# Patient Record
Sex: FEMALE | Race: WHITE | NOT HISPANIC OR LATINO | Employment: OTHER | URBAN - METROPOLITAN AREA
[De-identification: names, ages, dates, MRNs, and addresses within clinical notes are randomized per-mention and may not be internally consistent; named-entity substitution may affect disease eponyms.]

---

## 2017-05-31 ENCOUNTER — ALLSCRIPTS OFFICE VISIT (OUTPATIENT)
Dept: OTHER | Facility: OTHER | Age: 62
End: 2017-05-31

## 2017-09-21 ENCOUNTER — HOSPITAL ENCOUNTER (OUTPATIENT)
Dept: MAMMOGRAPHY | Facility: CLINIC | Age: 62
Discharge: HOME | End: 2017-09-21
Payer: OTHER GOVERNMENT

## 2017-09-21 DIAGNOSIS — Z12.31 ENCOUNTER FOR SCREENING MAMMOGRAM FOR MALIGNANT NEOPLASM OF BREAST: ICD-10-CM

## 2017-09-21 DIAGNOSIS — Z80.3 FAMILY HISTORY OF MALIGNANT NEOPLASM OF BREAST: ICD-10-CM

## 2017-09-21 PROCEDURE — 77063 BREAST TOMOSYNTHESIS BI: CPT

## 2017-09-21 PROCEDURE — G0202 SCR MAMMO BI INCL CAD: HCPCS

## 2018-01-03 ENCOUNTER — TRANSCRIBE ORDERS (OUTPATIENT)
Dept: ADMINISTRATIVE | Facility: HOSPITAL | Age: 63
End: 2018-01-03

## 2018-01-03 DIAGNOSIS — Z13.820 OSTEOPOROSIS SCREENING: Primary | ICD-10-CM

## 2018-01-10 ENCOUNTER — GENERIC CONVERSION - ENCOUNTER (OUTPATIENT)
Dept: OTHER | Facility: OTHER | Age: 63
End: 2018-01-10

## 2018-01-10 ENCOUNTER — HOSPITAL ENCOUNTER (OUTPATIENT)
Dept: BONE DENSITY | Facility: IMAGING CENTER | Age: 63
Discharge: HOME | End: 2018-01-10
Payer: OTHER GOVERNMENT

## 2018-01-10 DIAGNOSIS — Z13.820 OSTEOPOROSIS SCREENING: ICD-10-CM

## 2018-01-10 PROCEDURE — 77080 DXA BONE DENSITY AXIAL: CPT

## 2018-01-13 VITALS
SYSTOLIC BLOOD PRESSURE: 140 MMHG | HEIGHT: 67 IN | TEMPERATURE: 98.3 F | HEART RATE: 76 BPM | WEIGHT: 182.25 LBS | DIASTOLIC BLOOD PRESSURE: 80 MMHG | RESPIRATION RATE: 18 BRPM | BODY MASS INDEX: 28.61 KG/M2

## 2018-05-24 PROBLEM — Z80.3 FAMILY HISTORY OF BREAST CANCER: Status: ACTIVE | Noted: 2018-05-24

## 2018-05-30 ENCOUNTER — OFFICE VISIT (OUTPATIENT)
Dept: SURGICAL ONCOLOGY | Facility: CLINIC | Age: 63
End: 2018-05-30
Payer: OTHER GOVERNMENT

## 2018-05-30 VITALS
HEART RATE: 68 BPM | BODY MASS INDEX: 28.25 KG/M2 | RESPIRATION RATE: 14 BRPM | WEIGHT: 180 LBS | DIASTOLIC BLOOD PRESSURE: 78 MMHG | SYSTOLIC BLOOD PRESSURE: 142 MMHG | HEIGHT: 67 IN | TEMPERATURE: 99 F

## 2018-05-30 DIAGNOSIS — Z80.3 FAMILY HISTORY OF BREAST CANCER: Primary | ICD-10-CM

## 2018-05-30 DIAGNOSIS — Z12.31 VISIT FOR SCREENING MAMMOGRAM: ICD-10-CM

## 2018-05-30 PROCEDURE — 99213 OFFICE O/P EST LOW 20 MIN: CPT | Performed by: NURSE PRACTITIONER

## 2018-05-30 RX ORDER — OMEPRAZOLE 20 MG/1
CAPSULE, DELAYED RELEASE ORAL
COMMUNITY
Start: 2018-03-02

## 2018-05-30 RX ORDER — B-COMPLEX WITH VITAMIN C
TABLET ORAL DAILY
COMMUNITY
Start: 2012-04-19

## 2018-05-30 RX ORDER — GLUCOSAMINE SULFATE 500 MG
CAPSULE ORAL DAILY
COMMUNITY
Start: 2012-04-12

## 2018-05-30 RX ORDER — MELOXICAM 15 MG/1
TABLET ORAL
COMMUNITY
Start: 2018-03-02

## 2018-05-30 RX ORDER — MONTELUKAST SODIUM 10 MG/1
TABLET ORAL
COMMUNITY
Start: 2018-05-26

## 2018-05-30 RX ORDER — CALCIUM CARBONATE 500(1250)
TABLET ORAL DAILY
COMMUNITY
Start: 2012-04-12

## 2018-05-30 NOTE — PROGRESS NOTES
Surgical Oncology Follow Up       [address] [facility],[address] [facility]  [facility] [place]  [address] [place] [address] [address] [place]  1955  [id number]  [address] [place] [address],[address] [place] [address]  [facility] [place]  [address] [address]    Chief Complaint   Patient presents with    Follow-up     1 yr f/u family history breast cancer       Assessment/Plan:  1  Family history of breast cancer  -1 year follow up    2  Visit for screening mammogram  - Mammo screening bilateral w cad; Future      Discussion/Summary:  Patient is a 76-year-old female who presents today for a 1 year follow-up visit for an increased risk of breast cancer due to a family history  The patient has had 1 breast biopsy which was a cyst and her sister was diagnosed with breast cancer in her 46s  Patient has declined genetic testing  She has preferred to avoid breast MRIs in the past and has been followed with annual mammography as well as clinical breast exams  She had a bilateral screening mammogram performed on September 21, 2017 which was BI-RADS 2  Her lifetime tie risk 2 sick risk is 21 8% and her NCI lifetime risk is 21 4%  No worrisome findings on today's exam  Her insurance did not cover the 3D mammogram last year  She does not have dense breast tissue  Therefore, we will order a bilateral screening mammogram for September  She has a clinical breast exam by Dr Renea Padilla in winter, therefore, we will see the patient back in 1 year, or sooner if the need arises  She was instructed to call with any new concerns or symptoms  All of her questions were answered  History of Present Illness:      -Interval History: Patient presents today for a family history of breast cancer  She had a bilateral screening mammogram performed on September 21, 2017 which was BI-RADS 2    She reports that her niece was diagnosed with breast cancer in her 42's and tested negative to genetic mutations  Denies other changes in family history  She has no concerns today  Review of Systems:  Review of Systems   Constitutional: Negative for activity change, appetite change, chills, fatigue, fever and unexpected weight change  HENT: Negative for trouble swallowing  Eyes: Negative for pain, redness and visual disturbance  Respiratory: Negative for cough, shortness of breath and wheezing  Cardiovascular: Negative for chest pain, palpitations and leg swelling  Gastrointestinal: Negative for abdominal pain, constipation, diarrhea, nausea and vomiting  Endocrine: Negative for cold intolerance and heat intolerance  Musculoskeletal: Positive for arthralgias  Negative for back pain, gait problem and myalgias  Skin: Negative for color change and rash  Neurological: Negative for dizziness, syncope, light-headedness, numbness and headaches  Hematological: Negative for adenopathy  Psychiatric/Behavioral: Negative for agitation and confusion  All other systems reviewed and are negative  Patient Active Problem List   Diagnosis    Asthma    GERD (gastroesophageal reflux disease)    Family history of breast cancer     History reviewed  No pertinent past medical history  History reviewed  No pertinent surgical history  Family History   Problem Relation Age of Onset    Colon cancer Father      metastatic    Breast cancer Sister      Social History     Social History    Marital status:      Spouse name: N/A    Number of children: N/A    Years of education: N/A     Occupational History    Not on file       Social History Main Topics    Smoking status: Not on file    Smokeless tobacco: Not on file    Alcohol use Not on file    Drug use: Unknown    Sexual activity: Not on file     Other Topics Concern    Not on file     Social History Narrative    No narrative on file       Current Outpatient Prescriptions:     Calcium 500 MG tablet, Take by mouth daily, Disp: , Rfl:    glucosamine 500 MG CAPS capsule, Take by mouth daily, Disp: , Rfl:     meloxicam (MOBIC) 15 mg tablet, , Disp: , Rfl:     montelukast (SINGULAIR) 10 mg tablet, , Disp: , Rfl:     omeprazole (PriLOSEC) 20 mg delayed release capsule, , Disp: , Rfl:     VITAMIN B COMPLEX-C CAPS, Take by mouth daily, Disp: , Rfl:   Allergies   Allergen Reactions    Hydrocodone-Acetaminophen Confusion     Reaction Date: 12Apr2012;      Vitals:    05/30/18 0849   BP: 142/78   Pulse: 68   Resp: 14   Temp: 99 °F (37 2 °C)       Physical Exam   Constitutional: She is oriented to person, place, and time  Vital signs are normal  She appears well-developed and well-nourished  No distress  HENT:   Head: Normocephalic and atraumatic  Neck: Normal range of motion  Cardiovascular: Normal rate, regular rhythm and normal heart sounds  Pulmonary/Chest: Effort normal and breath sounds normal    Bilateral breasts were examined in the sitting and supine position  There are no masses, skin nodules, nipple changes or nipple discharge  There is no bilateral supraclavicular or axillary lymphadenopathy noted  Abdominal: Soft  Normal appearance  She exhibits no mass  There is no hepatosplenomegaly  There is no tenderness  Musculoskeletal: Normal range of motion  Lymphadenopathy:     She has no axillary adenopathy  Right: No supraclavicular adenopathy present  Left: No supraclavicular adenopathy present  Neurological: She is alert and oriented to person, place, and time  Skin: Skin is warm, dry and intact  No rash noted  She is not diaphoretic  Psychiatric: She has a normal mood and affect  Her speech is normal    Vitals reviewed  Advance Care Planning/Advance Directives:  Discussed disease status, treatment goals with the patient

## 2018-09-13 DIAGNOSIS — Z12.31 VISIT FOR SCREENING MAMMOGRAM: Primary | ICD-10-CM

## 2018-09-24 ENCOUNTER — HOSPITAL ENCOUNTER (OUTPATIENT)
Dept: MAMMOGRAPHY | Facility: CLINIC | Age: 63
Discharge: HOME | End: 2018-09-24
Payer: OTHER GOVERNMENT

## 2018-09-24 DIAGNOSIS — Z12.31 VISIT FOR SCREENING MAMMOGRAM: ICD-10-CM

## 2018-09-24 PROCEDURE — 77063 BREAST TOMOSYNTHESIS BI: CPT

## 2018-09-24 PROCEDURE — 77067 SCR MAMMO BI INCL CAD: CPT

## 2019-08-23 ENCOUNTER — TRANSCRIBE ORDERS (OUTPATIENT)
Dept: ADMINISTRATIVE | Facility: HOSPITAL | Age: 64
End: 2019-08-23

## 2019-08-23 DIAGNOSIS — Z12.39 BREAST SCREENING: ICD-10-CM

## 2019-08-23 DIAGNOSIS — Z12.39 BREAST SCREENING, UNSPECIFIED: Primary | ICD-10-CM

## 2019-11-13 ENCOUNTER — TELEPHONE (OUTPATIENT)
Dept: SCHEDULING | Facility: CLINIC | Age: 64
End: 2019-11-13

## 2019-11-13 NOTE — TELEPHONE ENCOUNTER
Pt is self referred. Pt would like to schedule appt for cardiac evaluation. Pt has swelling of ankles. No previous cardiologist. Pt can be reached at [phone].

## 2019-11-14 NOTE — TELEPHONE ENCOUNTER
I called and spoke to pt. She states her  is a pt of Dr. Nicholson- he had recommended she see Dr. Sargent for her swollen ankles. Pt states its been since July. No sob, cp or palps.     Pt states her cholesterol in under 200- its always borderline.    She last saw her PCP and had labs done in Aug 2019.    Pt has never seen a cardiologist -- no cardiac testing.    No urgent care, ER or hospital recently.    Family History of Heart Disease  Mom- HTN  Paternal grandmother- stroke @ 80s   Brother- Bypass @ 67y/o    Pt is scheduled for 12/16/19- [facility] (per her request)    I faxed PCP for records    Mailed NPP to pt's home.

## 2019-12-03 ENCOUNTER — HOSPITAL ENCOUNTER (OUTPATIENT)
Dept: MAMMOGRAPHY | Facility: CLINIC | Age: 64
Discharge: HOME | End: 2019-12-03
Payer: OTHER GOVERNMENT

## 2019-12-03 VITALS — WEIGHT: 180 LBS | HEIGHT: 67 IN | BODY MASS INDEX: 28.25 KG/M2

## 2019-12-03 DIAGNOSIS — Z12.39 BREAST SCREENING: ICD-10-CM

## 2019-12-03 PROCEDURE — 77067 SCR MAMMO BI INCL CAD: CPT

## 2019-12-03 PROCEDURE — 77063 BREAST TOMOSYNTHESIS BI: CPT

## 2019-12-16 ENCOUNTER — OFFICE VISIT (OUTPATIENT)
Dept: CARDIOLOGY | Facility: CLINIC | Age: 64
End: 2019-12-16
Payer: OTHER GOVERNMENT

## 2019-12-16 ENCOUNTER — TELEPHONE (OUTPATIENT)
Dept: CARDIOLOGY | Facility: CLINIC | Age: 64
End: 2019-12-16

## 2019-12-16 VITALS
DIASTOLIC BLOOD PRESSURE: 80 MMHG | SYSTOLIC BLOOD PRESSURE: 120 MMHG | OXYGEN SATURATION: 95 % | HEART RATE: 83 BPM | BODY MASS INDEX: 29.41 KG/M2 | HEIGHT: 66 IN | WEIGHT: 183 LBS

## 2019-12-16 DIAGNOSIS — R94.31 ABNORMAL EKG: ICD-10-CM

## 2019-12-16 DIAGNOSIS — Z82.49 FAMILY HISTORY OF PREMATURE CAD: ICD-10-CM

## 2019-12-16 DIAGNOSIS — E78.2 MIXED HYPERLIPIDEMIA: ICD-10-CM

## 2019-12-16 DIAGNOSIS — R60.9 EDEMA, UNSPECIFIED TYPE: ICD-10-CM

## 2019-12-16 DIAGNOSIS — Z82.49 FAMILY HISTORY OF PREMATURE CAD: Primary | ICD-10-CM

## 2019-12-16 DIAGNOSIS — J45.909 ASTHMA, UNSPECIFIED ASTHMA SEVERITY, UNSPECIFIED WHETHER COMPLICATED, UNSPECIFIED WHETHER PERSISTENT: Primary | ICD-10-CM

## 2019-12-16 PROBLEM — R60.0 LOCALIZED EDEMA: Status: ACTIVE | Noted: 2019-12-16

## 2019-12-16 PROCEDURE — 99244 OFF/OP CNSLTJ NEW/EST MOD 40: CPT | Performed by: INTERNAL MEDICINE

## 2019-12-16 PROCEDURE — 93000 ELECTROCARDIOGRAM COMPLETE: CPT | Performed by: INTERNAL MEDICINE

## 2019-12-16 RX ORDER — OMEPRAZOLE 20 MG/1
20 CAPSULE, DELAYED RELEASE ORAL SEE ADMIN INSTRUCTIONS
COMMUNITY

## 2019-12-16 RX ORDER — MONTELUKAST SODIUM 10 MG/1
TABLET ORAL NIGHTLY
COMMUNITY

## 2019-12-16 RX ORDER — MELOXICAM 15 MG/1
15 TABLET ORAL DAILY
COMMUNITY

## 2019-12-16 ASSESSMENT — ENCOUNTER SYMPTOMS
BRUISES/BLEEDS EASILY: 0
PND: 0
JAUNDICE: 0
CLAUDICATION: 0
HEMOPTYSIS: 0
NUMBNESS: 0
WEIGHT GAIN: 0
ANOREXIA: 0
VERTIGO: 0
NERVOUS/ANXIOUS: 0
HOARSE VOICE: 0
PALPITATIONS: 0
HEARTBURN: 0
COUGH: 0
LIGHT-HEADEDNESS: 0
HEADACHES: 0
WHEEZING: 0
CONSTIPATION: 0
SHORTNESS OF BREATH: 0
SYNCOPE: 0
ORTHOPNEA: 0
NEAR-SYNCOPE: 0
FALLS: 0
FREQUENCY: 0
SLEEP DISTURBANCES DUE TO BREATHING: 0
SPUTUM PRODUCTION: 0
CHANGE IN BOWEL HABIT: 0
NAUSEA: 0
DYSPNEA ON EXERTION: 0
IRREGULAR HEARTBEAT: 0
SNORING: 0
HEMATOLOGIC/LYMPHATIC NEGATIVE: 1
TREMORS: 0
MEMORY LOSS: 0
WEIGHT LOSS: 0
ABDOMINAL PAIN: 0
DIARRHEA: 0
FOCAL WEAKNESS: 0
MYALGIAS: 0
INSOMNIA: 0
MUSCLE CRAMPS: 0
DIZZINESS: 0

## 2019-12-16 NOTE — ASSESSMENT & PLAN NOTE
Complains of intermittent edema over the past year she is on an NSAID which can exacerbate things but she is been on that a couple years check LV systolic function with echocardiogram unlikely to be heart failure

## 2019-12-16 NOTE — ASSESSMENT & PLAN NOTE
Elevated with non- if no plaque no indication for statin therapy if evidence of subclinical arterial sclerosis will talk about statin therapy

## 2019-12-16 NOTE — LETTER
December 16, 2019     Dayna Mcclellan MD  [address] [facility] [address]    Patient: Geraldine Wolff  YOB: 1955  Date of Visit: 12/16/2019      Dear Dr. Mcclellan:    Thank you for referring Geraldine Wolff to me for evaluation. Below are my notes for this consultation.    If you have questions, please do not hesitate to call me. I look forward to following your patient along with you.         Sincerely,        Payam Sargent MD        CC: No Recipients  Payam Sargent MD  12/16/2019  2:18 PM  Sign at close encounter  Cardiology Consult/New Patient    Dayna Mcclellan MD          Geraldine Wolff is a 63 y.o. female identifies as who presents with   She is here for cardiac assessment she is noticed some mild swelling in her legs  Risk factors include mild hyperlipidemia and brother having her bypass surgery  She denies chest pain or shortness of breath                Lab work August 2019  Cholesterol 194   Creatinine 0.87 potassium 5.4  HDL 60 non-                  Patient Active Problem List    Diagnosis Date Noted   • Mixed hyperlipidemia 12/16/2019   • Asthma 12/16/2019   • Family history of premature CAD 12/16/2019   • Abnormal EKG 12/16/2019   • Localized edema 12/16/2019       Medical History:   Past Medical History:   Diagnosis Date   • GERD (gastroesophageal reflux disease)        Surgical History:   Past Surgical History:   Procedure Laterality Date   • CHOLECYSTECTOMY     • FRACTURE SURGERY         Allergies: Hydrocodone-acetaminophen    Current Outpatient Medications   Medication Sig Dispense Refill   • calcium carbonate/vitamin D3 (CALCIUM 600 + D,3, ORAL) Take by mouth daily.     • FLUTICASONE PROPIONATE INHL Inhale 1 puff 2 (two) times a day. Rinse mouth with water after use to reduce aftertaste and incidence of candidiasis. Do not swallow.     • meloxicam (MOBIC) 15 mg tablet Take 15 mg by mouth daily.     • montelukast (SINGULAIR) 10 mg tablet Take  by mouth nightly.     • mv-min/iron/folic/calcium/vitK (WOMEN'S MULTIVITAMIN ORAL) Take by mouth daily.     • omeprazole (PriLOSEC) 20 mg capsule Take 20 mg by mouth daily before breakfast.       No current facility-administered medications for this visit.        Social History:   Social History     Socioeconomic History   • Marital status:      Spouse name: None   • Number of children: None   • Years of education: None   • Highest education level: None   Occupational History   • None   Social Needs   • Financial resource strain: None   • Food insecurity:     Worry: None     Inability: None   • Transportation needs:     Medical: None     Non-medical: None   Tobacco Use   • Smoking status: Never Smoker   • Smokeless tobacco: Never Used   Substance and Sexual Activity   • Alcohol use: Yes     Alcohol/week: 1.0 standard drinks     Types: 1 Glasses of wine per week   • Drug use: Defer   • Sexual activity: Defer   Lifestyle   • Physical activity:     Days per week: None     Minutes per session: None   • Stress: None   Relationships   • Social connections:     Talks on phone: None     Gets together: None     Attends Baptism service: None     Active member of club or organization: None     Attends meetings of clubs or organizations: None     Relationship status: None   • Intimate partner violence:     Fear of current or ex partner: None     Emotionally abused: None     Physically abused: None     Forced sexual activity: None   Other Topics Concern   • None   Social History Narrative   • None       Family History:   Family History   Problem Relation Age of Onset   • Hypertension Biological Mother    • Heart attack Biological Brother    • Heart disease Biological Brother        Review of Systems   Review of Systems   Constitution: Negative for malaise/fatigue, weight gain and weight loss.   HENT: Negative for hearing loss and hoarse voice.    Eyes: Negative for visual disturbance.   Cardiovascular: Positive for leg  swelling. Negative for chest pain, claudication, cyanosis, dyspnea on exertion, irregular heartbeat, near-syncope, orthopnea, palpitations, paroxysmal nocturnal dyspnea and syncope.   Respiratory: Negative for cough, hemoptysis, shortness of breath, sleep disturbances due to breathing, snoring, sputum production and wheezing.    Endocrine: Negative for cold intolerance and heat intolerance.   Hematologic/Lymphatic: Negative.  Negative for bleeding problem. Does not bruise/bleed easily.   Skin: Negative.  Negative for rash.   Musculoskeletal: Negative for arthritis, falls, joint pain, muscle cramps and myalgias.   Gastrointestinal: Negative for abdominal pain, anorexia, change in bowel habit, constipation, diarrhea, dysphagia, heartburn, jaundice and nausea.   Genitourinary: Negative for frequency and nocturia.   Neurological: Negative for dizziness, focal weakness, headaches, light-headedness, numbness, tremors and vertigo.   Psychiatric/Behavioral: Negative for memory loss. The patient does not have insomnia and is not nervous/anxious.    Allergic/Immunologic: Negative for hives.       Objective       Vitals:    12/16/19 1343   BP: 120/80   Pulse: 83   SpO2: 95%       Physical Exam   Constitutional: She is oriented to person, place, and time. She appears well-developed and well-nourished. No distress.   HENT:   Head: Normocephalic and atraumatic.   Nose: Nose normal.   Eyes: Conjunctivae are normal. No scleral icterus.   Neck: No JVD present.   Cardiovascular: Normal rate, regular rhythm, normal heart sounds and intact distal pulses. Exam reveals no gallop and no friction rub.   No murmur heard.  Trace edema   Pulmonary/Chest: Effort normal. No stridor. No respiratory distress. She has no wheezes. She has no rales. She exhibits no tenderness.   Abdominal: There is no tenderness.   Musculoskeletal: She exhibits no edema or deformity.   Neurological: She is alert and oriented to person, place, and time.   Skin: Skin  is warm and dry.   Psychiatric: She has a normal mood and affect.        Labs   No results found for: WBC, HGB, HCT, PLT, CHOL, TRIG, HDL, LDLDIRECT, ALT, AST, NA, K, CL, CREATININE, BUN, CO2, TSH, PSA, INR, HGBA1C, MICROALBUR    Imaging      ECG         Assessment/Plan     Family history of premature CAD  Her brother had bypass surgery her risk factors include minimally elevated LDL cholesterol 113  Screen with coronary calcium score  And stress echocardiogram    Abnormal EKG  Baseline EKG abnormal with anterior T wave inversion she does remember being told anything about her EKG being abnormal before possible normal variant evaluate with stress echo    Localized edema  Complains of intermittent edema over the past year she is on an NSAID which can exacerbate things but she is been on that a couple years check LV systolic function with echocardiogram unlikely to be heart failure    Mixed hyperlipidemia  Elevated with non- if no plaque no indication for statin therapy if evidence of subclinical arterial sclerosis will talk about statin therapy       I will see her back after coronary calcium score and at time of stress echo  If we are dealing with the no significant arterial sclerosis  Can probably use low-dose diuretic intermittently for edema  Suspect local venous insufficiency if LV systolic function is normal      Payam Sargent MD  12/16/2019

## 2019-12-16 NOTE — ASSESSMENT & PLAN NOTE
Her brother had bypass surgery her risk factors include minimally elevated LDL cholesterol 113  Screen with coronary calcium score  And stress echocardiogram

## 2019-12-16 NOTE — PROGRESS NOTES
Cardiology Consult/New Patient    Dayna Mcclellan MD          Geraldine Wolff is a 63 y.o. female identifies as who presents with   She is here for cardiac assessment she is noticed some mild swelling in her legs  Risk factors include mild hyperlipidemia and brother having her bypass surgery  She denies chest pain or shortness of breath                Lab work August 2019  Cholesterol 194   Creatinine 0.87 potassium 5.4  HDL 60 non-                  Patient Active Problem List    Diagnosis Date Noted   • Mixed hyperlipidemia 12/16/2019   • Asthma 12/16/2019   • Family history of premature CAD 12/16/2019   • Abnormal EKG 12/16/2019   • Localized edema 12/16/2019       Medical History:   Past Medical History:   Diagnosis Date   • GERD (gastroesophageal reflux disease)        Surgical History:   Past Surgical History:   Procedure Laterality Date   • CHOLECYSTECTOMY     • FRACTURE SURGERY         Allergies: Hydrocodone-acetaminophen    Current Outpatient Medications   Medication Sig Dispense Refill   • calcium carbonate/vitamin D3 (CALCIUM 600 + D,3, ORAL) Take by mouth daily.     • FLUTICASONE PROPIONATE INHL Inhale 1 puff 2 (two) times a day. Rinse mouth with water after use to reduce aftertaste and incidence of candidiasis. Do not swallow.     • meloxicam (MOBIC) 15 mg tablet Take 15 mg by mouth daily.     • montelukast (SINGULAIR) 10 mg tablet Take by mouth nightly.     • mv-min/iron/folic/calcium/vitK (WOMEN'S MULTIVITAMIN ORAL) Take by mouth daily.     • omeprazole (PriLOSEC) 20 mg capsule Take 20 mg by mouth daily before breakfast.       No current facility-administered medications for this visit.        Social History:   Social History     Socioeconomic History   • Marital status:      Spouse name: None   • Number of children: None   • Years of education: None   • Highest education level: None   Occupational History   • None   Social Needs   • Financial resource strain: None   • Food  insecurity:     Worry: None     Inability: None   • Transportation needs:     Medical: None     Non-medical: None   Tobacco Use   • Smoking status: Never Smoker   • Smokeless tobacco: Never Used   Substance and Sexual Activity   • Alcohol use: Yes     Alcohol/week: 1.0 standard drinks     Types: 1 Glasses of wine per week   • Drug use: Defer   • Sexual activity: Defer   Lifestyle   • Physical activity:     Days per week: None     Minutes per session: None   • Stress: None   Relationships   • Social connections:     Talks on phone: None     Gets together: None     Attends Gnosticist service: None     Active member of club or organization: None     Attends meetings of clubs or organizations: None     Relationship status: None   • Intimate partner violence:     Fear of current or ex partner: None     Emotionally abused: None     Physically abused: None     Forced sexual activity: None   Other Topics Concern   • None   Social History Narrative   • None       Family History:   Family History   Problem Relation Age of Onset   • Hypertension Biological Mother    • Heart attack Biological Brother    • Heart disease Biological Brother        Review of Systems   Review of Systems   Constitution: Negative for malaise/fatigue, weight gain and weight loss.   HENT: Negative for hearing loss and hoarse voice.    Eyes: Negative for visual disturbance.   Cardiovascular: Positive for leg swelling. Negative for chest pain, claudication, cyanosis, dyspnea on exertion, irregular heartbeat, near-syncope, orthopnea, palpitations, paroxysmal nocturnal dyspnea and syncope.   Respiratory: Negative for cough, hemoptysis, shortness of breath, sleep disturbances due to breathing, snoring, sputum production and wheezing.    Endocrine: Negative for cold intolerance and heat intolerance.   Hematologic/Lymphatic: Negative.  Negative for bleeding problem. Does not bruise/bleed easily.   Skin: Negative.  Negative for rash.   Musculoskeletal: Negative  for arthritis, falls, joint pain, muscle cramps and myalgias.   Gastrointestinal: Negative for abdominal pain, anorexia, change in bowel habit, constipation, diarrhea, dysphagia, heartburn, jaundice and nausea.   Genitourinary: Negative for frequency and nocturia.   Neurological: Negative for dizziness, focal weakness, headaches, light-headedness, numbness, tremors and vertigo.   Psychiatric/Behavioral: Negative for memory loss. The patient does not have insomnia and is not nervous/anxious.    Allergic/Immunologic: Negative for hives.       Objective       Vitals:    12/16/19 1343   BP: 120/80   Pulse: 83   SpO2: 95%       Physical Exam   Constitutional: She is oriented to person, place, and time. She appears well-developed and well-nourished. No distress.   HENT:   Head: Normocephalic and atraumatic.   Nose: Nose normal.   Eyes: Conjunctivae are normal. No scleral icterus.   Neck: No JVD present.   Cardiovascular: Normal rate, regular rhythm, normal heart sounds and intact distal pulses. Exam reveals no gallop and no friction rub.   No murmur heard.  Trace edema   Pulmonary/Chest: Effort normal. No stridor. No respiratory distress. She has no wheezes. She has no rales. She exhibits no tenderness.   Abdominal: There is no tenderness.   Musculoskeletal: She exhibits no edema or deformity.   Neurological: She is alert and oriented to person, place, and time.   Skin: Skin is warm and dry.   Psychiatric: She has a normal mood and affect.        Labs   No results found for: WBC, HGB, HCT, PLT, CHOL, TRIG, HDL, LDLDIRECT, ALT, AST, NA, K, CL, CREATININE, BUN, CO2, TSH, PSA, INR, HGBA1C, MICROALBUR    Imaging    CAT  COR VENKATESH SCORE ZERO 12/19  ECG         Assessment/Plan     Family history of premature CAD  Her brother had bypass surgery her risk factors include minimally elevated LDL cholesterol 113  Screen with coronary calcium score  And stress echocardiogram    Abnormal EKG  Baseline EKG abnormal with anterior T wave  inversion she does remember being told anything about her EKG being abnormal before possible normal variant evaluate with stress echo    Localized edema  Complains of intermittent edema over the past year she is on an NSAID which can exacerbate things but she is been on that a couple years check LV systolic function with echocardiogram unlikely to be heart failure    Mixed hyperlipidemia  Elevated with non- if no plaque no indication for statin therapy if evidence of subclinical arterial sclerosis will talk about statin therapy       I will see her back after coronary calcium score and at time of stress echo  If we are dealing with the no significant arterial sclerosis  Can probably use low-dose diuretic intermittently for edema  Suspect local venous insufficiency if LV systolic function is normal      Payma Sargent MD  12/18/2019

## 2019-12-18 ENCOUNTER — HOSPITAL ENCOUNTER (OUTPATIENT)
Dept: RADIOLOGY | Age: 64
Discharge: HOME | End: 2019-12-18
Attending: INTERNAL MEDICINE

## 2019-12-18 ENCOUNTER — TELEPHONE (OUTPATIENT)
Dept: CARDIOLOGY | Facility: CLINIC | Age: 64
End: 2019-12-18

## 2019-12-18 DIAGNOSIS — R94.31 ABNORMAL EKG: ICD-10-CM

## 2019-12-18 PROCEDURE — 75571 CT HRT W/O DYE W/CA TEST: CPT

## 2019-12-18 NOTE — TELEPHONE ENCOUNTER
LMOM her coronary calcium score was 0, Dr. Sargent will see pt at her next OV with stress echo on 1/23/2020. Advised pt to call back with any questions.

## 2020-01-20 ASSESSMENT — ENCOUNTER SYMPTOMS
WEIGHT GAIN: 0
CLAUDICATION: 0
DIARRHEA: 0
CHANGE IN BOWEL HABIT: 0
MYALGIAS: 0
DYSPNEA ON EXERTION: 0
PALPITATIONS: 0
SYNCOPE: 0
WHEEZING: 0
ANOREXIA: 0
FREQUENCY: 0
TREMORS: 0
NAUSEA: 0
PND: 0
LIGHT-HEADEDNESS: 0
SPUTUM PRODUCTION: 0
CONSTIPATION: 0
DIZZINESS: 0
NUMBNESS: 0
INSOMNIA: 0
COUGH: 0
SNORING: 0
SHORTNESS OF BREATH: 0
IRREGULAR HEARTBEAT: 0
VERTIGO: 0
MEMORY LOSS: 0
FALLS: 0
ORTHOPNEA: 0
HEMOPTYSIS: 0
HEMATOLOGIC/LYMPHATIC NEGATIVE: 1
HEADACHES: 0
ABDOMINAL PAIN: 0
MUSCLE CRAMPS: 0
NEAR-SYNCOPE: 0
NERVOUS/ANXIOUS: 0
SLEEP DISTURBANCES DUE TO BREATHING: 0
FOCAL WEAKNESS: 0
HOARSE VOICE: 0
BRUISES/BLEEDS EASILY: 0
HEARTBURN: 0
WEIGHT LOSS: 0
JAUNDICE: 0

## 2020-01-20 NOTE — PROGRESS NOTES
Cardiology Consult/New Patient    Dayna Mcclellan MD          Geraldine Wolff is a 64 y.o. female identifies as who presents with     SHE Is here for follow-up and stress echo=o images personally reviewed  And is negative for ischemia  She has a  Family history premature cardiac disease with her brother having bypass surgery   she herself is an abnormal EKG with anterior T wave inversions  She complains of intermittent peripheral edema  She has mixed hyper lipidemia with non-HDL cholesterol 134  She had coronary calcium score of 0 done January 2020   but there is evidence of mild plaque of the thoracic aorta                          Patient Active Problem List    Diagnosis Date Noted   • Mixed hyperlipidemia 12/16/2019   • Asthma 12/16/2019   • Family history of premature CAD 12/16/2019   • Abnormal EKG 12/16/2019   • Localized edema 12/16/2019       Medical History:   Past Medical History:   Diagnosis Date   • GERD (gastroesophageal reflux disease)        Surgical History:   Past Surgical History:   Procedure Laterality Date   • CHOLECYSTECTOMY     • FRACTURE SURGERY         Allergies: Hydrocodone-acetaminophen    Current Outpatient Medications   Medication Sig Dispense Refill   • albuterol HFA (VENTOLIN HFA) 90 mcg/actuation inhaler Inhale 2 puffs as needed for wheezing.     • calcium carbonate/vitamin D3 (CALCIUM 600 + D,3, ORAL) Take by mouth daily.     • FLUTICASONE PROPIONATE INHL Inhale 1 puff as needed. Rinse mouth with water after use to reduce aftertaste and incidence of candidiasis. Do not swallow.       • montelukast (SINGULAIR) 10 mg tablet Take by mouth nightly.     • mv-min/iron/folic/calcium/vitK (WOMEN'S MULTIVITAMIN ORAL) Take by mouth daily.     • omeprazole (PriLOSEC) 20 mg capsule Take 20 mg by mouth daily before breakfast.     • meloxicam (MOBIC) 15 mg tablet Take 15 mg by mouth daily.     • rosuvastatin (CRESTOR) 10 mg tablet Take 1 tablet (10 mg total) by mouth daily. 90 tablet 1     No  current facility-administered medications for this visit.        Social History:   Social History     Socioeconomic History   • Marital status:      Spouse name: None   • Number of children: None   • Years of education: None   • Highest education level: None   Occupational History   • None   Social Needs   • Financial resource strain: None   • Food insecurity:     Worry: None     Inability: None   • Transportation needs:     Medical: None     Non-medical: None   Tobacco Use   • Smoking status: Never Smoker   • Smokeless tobacco: Never Used   Substance and Sexual Activity   • Alcohol use: Yes     Alcohol/week: 1.0 standard drinks     Types: 1 Glasses of wine per week   • Drug use: Defer   • Sexual activity: Defer   Lifestyle   • Physical activity:     Days per week: None     Minutes per session: None   • Stress: None   Relationships   • Social connections:     Talks on phone: None     Gets together: None     Attends Yazidism service: None     Active member of club or organization: None     Attends meetings of clubs or organizations: None     Relationship status: None   • Intimate partner violence:     Fear of current or ex partner: None     Emotionally abused: None     Physically abused: None     Forced sexual activity: None   Other Topics Concern   • None   Social History Narrative   • None       Family History:   Family History   Problem Relation Age of Onset   • Hypertension Biological Mother    • Heart attack Biological Brother    • Heart disease Biological Brother        Review of Systems   Review of Systems   Constitution: Negative for malaise/fatigue, weight gain and weight loss.   HENT: Negative for hearing loss and hoarse voice.    Eyes: Negative for visual disturbance.   Cardiovascular: Positive for leg swelling. Negative for chest pain, claudication, cyanosis, dyspnea on exertion, irregular heartbeat, near-syncope, orthopnea, palpitations, paroxysmal nocturnal dyspnea and syncope.   Respiratory:  Negative for cough, hemoptysis, shortness of breath, sleep disturbances due to breathing, snoring, sputum production and wheezing.    Endocrine: Negative for cold intolerance and heat intolerance.   Hematologic/Lymphatic: Negative.  Negative for bleeding problem. Does not bruise/bleed easily.   Skin: Negative.  Negative for rash.   Musculoskeletal: Negative for arthritis, falls, joint pain, muscle cramps and myalgias.   Gastrointestinal: Negative for abdominal pain, anorexia, change in bowel habit, constipation, diarrhea, dysphagia, heartburn, jaundice and nausea.   Genitourinary: Negative for frequency and nocturia.   Neurological: Negative for dizziness, focal weakness, headaches, light-headedness, numbness, tremors and vertigo.   Psychiatric/Behavioral: Negative for memory loss. The patient does not have insomnia and is not nervous/anxious.    Allergic/Immunologic: Negative for hives.       Objective       Vitals:    01/23/20 1450   BP: 118/78   Pulse: 89   SpO2: 97%       Physical Exam   Constitutional: She is oriented to person, place, and time. She appears well-developed and well-nourished. No distress.   HENT:   Head: Normocephalic and atraumatic.   Nose: Nose normal.   Eyes: Conjunctivae are normal. No scleral icterus.   Neck: No JVD present.   Cardiovascular: Normal rate, regular rhythm, normal heart sounds and intact distal pulses. Exam reveals no gallop and no friction rub.   No murmur heard.  Trace edema   Pulmonary/Chest: Effort normal. No stridor. No respiratory distress. She has no wheezes. She has no rales. She exhibits no tenderness.   Abdominal: There is no tenderness.   Musculoskeletal: She exhibits no edema or deformity.   Neurological: She is alert and oriented to person, place, and time.   Skin: Skin is warm and dry.   Psychiatric: She has a normal mood and affect.        Labs   No results found for: WBC, HGB, HCT, PLT, CHOL, TRIG, HDL, LDLDIRECT, ALT, AST, NA, K, CL, CREATININE, BUN, CO2, TSH,  PSA, INR, HGBA1C, MICROALBUR    Imaging      STRESS  Stress echo neg 1/20  CAT  COR VENKATESH SCORE ZERO 12/19 mild plaque thoracic aorta  ECG         Assessment/Plan     Abnormal EKG  Anterior T wave inversions no structural heart disease  Coronary calcium score January 2020 0 with mild plaque in thoracic aorta  Stress echocardiogram January 2020- for ischemia normal LV systolic function    Mixed hyperlipidemia  LDL cholesterol varies from 1 120, to 140 strong family history of premature cardiac disease and she herself has some subclinical arterial sclerosis on her thoracic aorta seen on CAT scan went over risks and benefits agreed to start statin therapy she is concerned because her mother had a bad reaction to 10 mg Crestor started a lab work in 3 months    Localized edema  Intermittent edema probably exacerbated by her required use of NSAIDs LV systolic function is normal no significant valvular disease with today stress echo no evidence of cardiac etiology       Baseline abnormal EKG with anterior T wave inversions  No structural heart disease with coronary calcium score of 0 and stress echo today negative for ischemia  Coronary calcium score did show some mild plaque in her thoracic aorta she had LDL cholesterol 140 back in 2018  And strong family history of premature cardiac disease  Went over risks and benefits of statin therapy she is concerned because her mother had a bad reaction but agrees to begin rosuvastatin 10 follow-up with lab work in 3 months        This letter was generated using speech recognition software.  Please excuse any typographical errors.  Payam Sargent MD  1/23/2020

## 2020-01-23 ENCOUNTER — HOSPITAL ENCOUNTER (OUTPATIENT)
Dept: CARDIOLOGY | Facility: CLINIC | Age: 65
Discharge: HOME | End: 2020-01-23
Payer: COMMERCIAL

## 2020-01-23 ENCOUNTER — OFFICE VISIT (OUTPATIENT)
Dept: CARDIOLOGY | Facility: CLINIC | Age: 65
End: 2020-01-23
Payer: COMMERCIAL

## 2020-01-23 VITALS — WEIGHT: 183 LBS | HEIGHT: 66 IN | BODY MASS INDEX: 29.41 KG/M2

## 2020-01-23 VITALS
HEIGHT: 66 IN | DIASTOLIC BLOOD PRESSURE: 78 MMHG | SYSTOLIC BLOOD PRESSURE: 118 MMHG | OXYGEN SATURATION: 97 % | WEIGHT: 177.6 LBS | BODY MASS INDEX: 28.54 KG/M2 | HEART RATE: 89 BPM

## 2020-01-23 DIAGNOSIS — R60.0 LOCALIZED EDEMA: ICD-10-CM

## 2020-01-23 DIAGNOSIS — J45.909 ASTHMA, UNSPECIFIED ASTHMA SEVERITY, UNSPECIFIED WHETHER COMPLICATED, UNSPECIFIED WHETHER PERSISTENT: Primary | ICD-10-CM

## 2020-01-23 DIAGNOSIS — R94.31 ABNORMAL EKG: ICD-10-CM

## 2020-01-23 DIAGNOSIS — Z82.49 FAMILY HISTORY OF PREMATURE CAD: ICD-10-CM

## 2020-01-23 DIAGNOSIS — E78.2 MIXED HYPERLIPIDEMIA: ICD-10-CM

## 2020-01-23 LAB
ASCENDING AORTA: 3.1 CM
AV PEAK GRADIENT: 8 MMHG
AV PEAK VELOCITY-S: 1.38 M/S
BSA FOR ECHO PROCEDURE: 1.97 M2
E WAVE DECELERATION TIME: 187 MS
E/A RATIO: 1
E/E' RATIO: 7.5
E/LAT E' RATIO: 7
EDV (BP): 74 CM3
EF (A4C): 70.9 %
EF A2C: 67.8 %
EJECTION FRACTION: 70.5 %
ESV (BP): 21.8 CM3
LA ESV (BP): 42.5 CM3
LA ESV INDEX (A2C): 24.37 CM3/M2
LA ESV INDEX (BP): 21.57 CM3/M2
LAAS-AP2: 17.5 CM2
LAAS-AP4: 13.8 CM2
LALD A4C: 4.69 CM
LALD A4C: 5.25 CM
LAV-S: 48 CM3
LEFT ATRIUM VOLUME INDEX: 21.83 CM3/M2
LEFT ATRIUM VOLUME: 43 CM3
LEFT VENTRICLE DIASTOLIC VOLUME INDEX: 43.71 CM3/M2
LEFT VENTRICLE DIASTOLIC VOLUME: 86.1 CM3
LEFT VENTRICLE SYSTOLIC VOLUME INDEX: 12.69 CM3/M2
LEFT VENTRICLE SYSTOLIC VOLUME: 25 CM3
LV DIASTOLIC VOLUME: 58.3 CM3
LV ESV (APICAL 2 CHAMBER): 18.8 CM3
LVAD-AP2: 23.9 CM2
LVAD-AP4: 28 CM2
LVAS-AP2: 11.9 CM2
LVAS-AP4: 14 CM2
LVEDVI(A2C): 29.59 CM3/M2
LVEDVI(BP): 37.56 CM3/M2
LVESVI(A2C): 9.54 CM3/M2
LVESVI(BP): 11.07 CM3/M2
LVLD-AP2: 8.18 CM
LVLD-AP4: 7.49 CM
LVLS-AP2: 6.48 CM
LVLS-AP4: 6.57 CM
LVOT PEAK VELOCITY: 1.12 M/S
MV E'TISSUE VEL-LAT: 0.08 M/S
MV E'TISSUE VEL-MED: 0.08 M/S
MV PEAK A VEL: 0.59 M/S
MV PEAK E VEL: 0.59 M/S
RVOT VMAX: 0.81 M/S
STRESS ANGINA INDEX: 0
STRESS BASELINE BP: NORMAL MMHG
STRESS BASELINE HR: 89 BPM
STRESS O2 SAT REST: 97 %
STRESS PERCENT HR: 94 %
STRESS POST ESTIMATED WORKLOAD: 8 METS
STRESS POST EXERCISE DUR MIN: 6 MIN
STRESS POST EXERCISE DUR SEC: 26 SEC
STRESS POST PEAK BP: NORMAL MMHG
STRESS POST PEAK HR: 148 BPM
STRESS TARGET HR: 133 BPM

## 2020-01-23 PROCEDURE — 99214 OFFICE O/P EST MOD 30 MIN: CPT | Performed by: INTERNAL MEDICINE

## 2020-01-23 PROCEDURE — 93351 STRESS TTE COMPLETE: CPT | Performed by: INTERNAL MEDICINE

## 2020-01-23 RX ORDER — ALBUTEROL SULFATE 90 UG/1
2 INHALANT RESPIRATORY (INHALATION) AS NEEDED
COMMUNITY

## 2020-01-23 RX ORDER — ROSUVASTATIN CALCIUM 10 MG/1
10 TABLET, COATED ORAL DAILY
Qty: 90 TABLET | Refills: 1 | Status: SHIPPED | OUTPATIENT
Start: 2020-01-23 | End: 2020-06-02 | Stop reason: SDUPTHER

## 2020-01-23 NOTE — ASSESSMENT & PLAN NOTE
LDL cholesterol varies from 1 120, to 140 strong family history of premature cardiac disease and she herself has some subclinical arterial sclerosis on her thoracic aorta seen on CAT scan went over risks and benefits agreed to start statin therapy she is concerned because her mother had a bad reaction to 10 mg Crestor started a lab work in 3 months

## 2020-01-23 NOTE — ASSESSMENT & PLAN NOTE
Anterior T wave inversions no structural heart disease  Coronary calcium score January 2020 0 with mild plaque in thoracic aorta  Stress echocardiogram January 2020- for ischemia normal LV systolic function

## 2020-01-23 NOTE — LETTER
January 23, 2020     Dayna Mcclellan MD  [id number] [place] [id number]    Patient: Geraldine Wolff  YOB: 1955  Date of Visit: 1/23/2020      Dear Dr. Mcclellan:    Thank you for referring Geraldine Wolff to me for evaluation. Below are my notes for this consultation.    If you have questions, please do not hesitate to call me. I look forward to following your patient along with you.         Sincerely,        Payam Sargent MD        CC: No Recipients  Payam Sargent MD  1/23/2020  3:07 PM  Sign at close encounter  Cardiology Consult/New Patient    Dayna Mcclellan MD          Geraldine Wolff is a 64 y.o. female identifies as who presents with     SHE Is here for follow-up and stress echo=o images personally reviewed  And is negative for ischemia  She has a  Family history premature cardiac disease with her brother having bypass surgery   she herself is an abnormal EKG with anterior T wave inversions  She complains of intermittent peripheral edema  She has mixed hyper lipidemia with non-HDL cholesterol 134  She had coronary calcium score of 0 done January 2020   but there is evidence of mild plaque of the thoracic aorta                          Patient Active Problem List    Diagnosis Date Noted   • Mixed hyperlipidemia 12/16/2019   • Asthma 12/16/2019   • Family history of premature CAD 12/16/2019   • Abnormal EKG 12/16/2019   • Localized edema 12/16/2019       Medical History:   Past Medical History:   Diagnosis Date   • GERD (gastroesophageal reflux disease)        Surgical History:   Past Surgical History:   Procedure Laterality Date   • CHOLECYSTECTOMY     • FRACTURE SURGERY         Allergies: Hydrocodone-acetaminophen    Current Outpatient Medications   Medication Sig Dispense Refill   • albuterol HFA (VENTOLIN HFA) 90 mcg/actuation inhaler Inhale 2 puffs as needed for wheezing.     • calcium carbonate/vitamin D3 (CALCIUM 600 + D,3, ORAL) Take by mouth daily.     •  FLUTICASONE PROPIONATE INHL Inhale 1 puff as needed. Rinse mouth with water after use to reduce aftertaste and incidence of candidiasis. Do not swallow.       • montelukast (SINGULAIR) 10 mg tablet Take by mouth nightly.     • mv-min/iron/folic/calcium/vitK (WOMEN'S MULTIVITAMIN ORAL) Take by mouth daily.     • omeprazole (PriLOSEC) 20 mg capsule Take 20 mg by mouth daily before breakfast.     • meloxicam (MOBIC) 15 mg tablet Take 15 mg by mouth daily.     • rosuvastatin (CRESTOR) 10 mg tablet Take 1 tablet (10 mg total) by mouth daily. 90 tablet 1     No current facility-administered medications for this visit.        Social History:   Social History     Socioeconomic History   • Marital status:      Spouse name: None   • Number of children: None   • Years of education: None   • Highest education level: None   Occupational History   • None   Social Needs   • Financial resource strain: None   • Food insecurity:     Worry: None     Inability: None   • Transportation needs:     Medical: None     Non-medical: None   Tobacco Use   • Smoking status: Never Smoker   • Smokeless tobacco: Never Used   Substance and Sexual Activity   • Alcohol use: Yes     Alcohol/week: 1.0 standard drinks     Types: 1 Glasses of wine per week   • Drug use: Defer   • Sexual activity: Defer   Lifestyle   • Physical activity:     Days per week: None     Minutes per session: None   • Stress: None   Relationships   • Social connections:     Talks on phone: None     Gets together: None     Attends Protestant service: None     Active member of club or organization: None     Attends meetings of clubs or organizations: None     Relationship status: None   • Intimate partner violence:     Fear of current or ex partner: None     Emotionally abused: None     Physically abused: None     Forced sexual activity: None   Other Topics Concern   • None   Social History Narrative   • None       Family History:   Family History   Problem Relation Age of  Onset   • Hypertension Biological Mother    • Heart attack Biological Brother    • Heart disease Biological Brother        Review of Systems   Review of Systems   Constitution: Negative for malaise/fatigue, weight gain and weight loss.   HENT: Negative for hearing loss and hoarse voice.    Eyes: Negative for visual disturbance.   Cardiovascular: Positive for leg swelling. Negative for chest pain, claudication, cyanosis, dyspnea on exertion, irregular heartbeat, near-syncope, orthopnea, palpitations, paroxysmal nocturnal dyspnea and syncope.   Respiratory: Negative for cough, hemoptysis, shortness of breath, sleep disturbances due to breathing, snoring, sputum production and wheezing.    Endocrine: Negative for cold intolerance and heat intolerance.   Hematologic/Lymphatic: Negative.  Negative for bleeding problem. Does not bruise/bleed easily.   Skin: Negative.  Negative for rash.   Musculoskeletal: Negative for arthritis, falls, joint pain, muscle cramps and myalgias.   Gastrointestinal: Negative for abdominal pain, anorexia, change in bowel habit, constipation, diarrhea, dysphagia, heartburn, jaundice and nausea.   Genitourinary: Negative for frequency and nocturia.   Neurological: Negative for dizziness, focal weakness, headaches, light-headedness, numbness, tremors and vertigo.   Psychiatric/Behavioral: Negative for memory loss. The patient does not have insomnia and is not nervous/anxious.    Allergic/Immunologic: Negative for hives.       Objective       Vitals:    01/23/20 1450   BP: 118/78   Pulse: 89   SpO2: 97%       Physical Exam   Constitutional: She is oriented to person, place, and time. She appears well-developed and well-nourished. No distress.   HENT:   Head: Normocephalic and atraumatic.   Nose: Nose normal.   Eyes: Conjunctivae are normal. No scleral icterus.   Neck: No JVD present.   Cardiovascular: Normal rate, regular rhythm, normal heart sounds and intact distal pulses. Exam reveals no gallop  and no friction rub.   No murmur heard.  Trace edema   Pulmonary/Chest: Effort normal. No stridor. No respiratory distress. She has no wheezes. She has no rales. She exhibits no tenderness.   Abdominal: There is no tenderness.   Musculoskeletal: She exhibits no edema or deformity.   Neurological: She is alert and oriented to person, place, and time.   Skin: Skin is warm and dry.   Psychiatric: She has a normal mood and affect.        Labs   No results found for: WBC, HGB, HCT, PLT, CHOL, TRIG, HDL, LDLDIRECT, ALT, AST, NA, K, CL, CREATININE, BUN, CO2, TSH, PSA, INR, HGBA1C, MICROALBUR    Imaging      STRESS  Stress echo neg 1/20  CAT  COR VENKATESH SCORE ZERO 12/19 mild plaque thoracic aorta  ECG         Assessment/Plan     Abnormal EKG  Anterior T wave inversions no structural heart disease  Coronary calcium score January 2020 0 with mild plaque in thoracic aorta  Stress echocardiogram January 2020- for ischemia normal LV systolic function    Mixed hyperlipidemia  LDL cholesterol varies from 1 120, to 140 strong family history of premature cardiac disease and she herself has some subclinical arterial sclerosis on her thoracic aorta seen on CAT scan went over risks and benefits agreed to start statin therapy she is concerned because her mother had a bad reaction to 10 mg Crestor started a lab work in 3 months    Localized edema  Intermittent edema probably exacerbated by her required use of NSAIDs LV systolic function is normal no significant valvular disease with today stress echo no evidence of cardiac etiology       Baseline abnormal EKG with anterior T wave inversions  No structural heart disease with coronary calcium score of 0 and stress echo today negative for ischemia  Coronary calcium score did show some mild plaque in her thoracic aorta she had LDL cholesterol 140 back in 2018  And strong family history of premature cardiac disease  Went over risks and benefits of statin therapy she is concerned because her  mother had a bad reaction but agrees to begin rosuvastatin 10 follow-up with lab work in 3 months        This letter was generated using speech recognition software.  Please excuse any typographical errors.  Payam Sargent MD  1/23/2020

## 2020-01-23 NOTE — ASSESSMENT & PLAN NOTE
Intermittent edema probably exacerbated by her required use of NSAIDs LV systolic function is normal no significant valvular disease with today stress echo no evidence of cardiac etiology

## 2020-04-13 ENCOUNTER — TELEPHONE (OUTPATIENT)
Dept: CARDIOLOGY | Facility: CLINIC | Age: 65
End: 2020-04-13

## 2020-04-13 NOTE — TELEPHONE ENCOUNTER
I called pt to confirm OV -- 4/21/2020- [facility] for pt to call me back.    Per Dr. Sargent,due to COVID-19,offer pt a telemed visit to have any problems,questions, concerns or lab results addressed. If not,then reschedule testing and OV. If labs ordered for future appt, pt will need to have done prior.

## 2020-04-14 NOTE — TELEPHONE ENCOUNTER
Pt called me back- Pt agreed to a Telemedicine Visit. I reviewed the expectations of the Telemedicine Visit. Pt agrees.     Pt will go to [facility] one day this week to have labs done.

## 2020-05-27 LAB
ALBUMIN SERPL-MCNC: 4.2 G/DL (ref 3.6–5.1)
ALBUMIN/GLOB SERPL: 1.9 (CALC) (ref 1–2.5)
ALP SERPL-CCNC: 63 U/L (ref 37–153)
ALT SERPL-CCNC: 14 U/L (ref 6–29)
AST SERPL-CCNC: 17 U/L (ref 10–35)
BASOPHILS # BLD AUTO: 38 CELLS/UL (ref 0–200)
BASOPHILS NFR BLD AUTO: 0.8 %
BILIRUB SERPL-MCNC: 0.5 MG/DL (ref 0.2–1.2)
BUN SERPL-MCNC: 15 MG/DL (ref 7–25)
BUN/CREAT SERPL: NORMAL (CALC) (ref 6–22)
CALCIUM SERPL-MCNC: 9.6 MG/DL (ref 8.6–10.4)
CHLORIDE SERPL-SCNC: 106 MMOL/L (ref 98–110)
CHOLEST SERPL-MCNC: 146 MG/DL
CHOLEST/HDLC SERPL: 2.4 (CALC)
CO2 SERPL-SCNC: 29 MMOL/L (ref 20–32)
CREAT SERPL-MCNC: 0.81 MG/DL (ref 0.5–0.99)
EOSINOPHIL # BLD AUTO: 188 CELLS/UL (ref 15–500)
EOSINOPHIL NFR BLD AUTO: 4 %
ERYTHROCYTE [DISTWIDTH] IN BLOOD BY AUTOMATED COUNT: 12.5 % (ref 11–15)
GLOBULIN SER CALC-MCNC: 2.2 G/DL (CALC) (ref 1.9–3.7)
GLUCOSE SERPL-MCNC: 93 MG/DL (ref 65–99)
HCT VFR BLD AUTO: 43.5 % (ref 35–45)
HDLC SERPL-MCNC: 60 MG/DL
HGB BLD-MCNC: 14 G/DL (ref 11.7–15.5)
LDLC SERPL CALC-MCNC: 66 MG/DL (CALC)
LYMPHOCYTES # BLD AUTO: 2021 CELLS/UL (ref 850–3900)
LYMPHOCYTES NFR BLD AUTO: 43 %
MCH RBC QN AUTO: 32.8 PG (ref 27–33)
MCHC RBC AUTO-ENTMCNC: 32.2 G/DL (ref 32–36)
MCV RBC AUTO: 101.9 FL (ref 80–100)
MONOCYTES # BLD AUTO: 404 CELLS/UL (ref 200–950)
MONOCYTES NFR BLD AUTO: 8.6 %
NEUTROPHILS # BLD AUTO: 2049 CELLS/UL (ref 1500–7800)
NEUTROPHILS NFR BLD AUTO: 43.6 %
NONHDLC SERPL-MCNC: 86 MG/DL (CALC)
PLATELET # BLD AUTO: 195 THOUSAND/UL (ref 140–400)
PMV BLD REES-ECKER: 9.8 FL (ref 7.5–12.5)
POTASSIUM SERPL-SCNC: 4.3 MMOL/L (ref 3.5–5.3)
PROT SERPL-MCNC: 6.4 G/DL (ref 6.1–8.1)
QUEST EGFR NON-AFR. AMERICAN: 77 ML/MIN/1.73M2
RBC # BLD AUTO: 4.27 MILLION/UL (ref 3.8–5.1)
SODIUM SERPL-SCNC: 142 MMOL/L (ref 135–146)
TRIGL SERPL-MCNC: 116 MG/DL
WBC # BLD AUTO: 4.7 THOUSAND/UL (ref 3.8–10.8)

## 2020-05-28 NOTE — TELEPHONE ENCOUNTER
I called pt to confirm OV -- 6/3/2020- [facility] for pt to call me back.    I need to speak to pt when she calls back.

## 2020-06-01 ASSESSMENT — ENCOUNTER SYMPTOMS
MYALGIAS: 0
TREMORS: 0
PND: 0
SHORTNESS OF BREATH: 0
MEMORY LOSS: 0
WEIGHT LOSS: 0
HOARSE VOICE: 0
ABDOMINAL PAIN: 0
LIGHT-HEADEDNESS: 0
HEMATOLOGIC/LYMPHATIC NEGATIVE: 1
SPUTUM PRODUCTION: 0
COUGH: 0
CONSTIPATION: 0
CLAUDICATION: 0
ORTHOPNEA: 0
SNORING: 0
WHEEZING: 0
IRREGULAR HEARTBEAT: 0
HEARTBURN: 0
HEMOPTYSIS: 0
PALPITATIONS: 0
BRUISES/BLEEDS EASILY: 0
JAUNDICE: 0
FREQUENCY: 0
DIZZINESS: 0
WEIGHT GAIN: 0
NUMBNESS: 0
DYSPNEA ON EXERTION: 0
SYNCOPE: 0
VERTIGO: 0
INSOMNIA: 0
NERVOUS/ANXIOUS: 0
CHANGE IN BOWEL HABIT: 0
FALLS: 0
FOCAL WEAKNESS: 0
SLEEP DISTURBANCES DUE TO BREATHING: 0
NAUSEA: 0
DIARRHEA: 0
NEAR-SYNCOPE: 0
ANOREXIA: 0
MUSCLE CRAMPS: 0
HEADACHES: 0

## 2020-06-01 NOTE — PROGRESS NOTES
Cardiology Consult/New Patient    Dayna Mcclellan MD          Geraldine Wolff is a 64 y.o. female identifies as who presents with       You and I are about to have a telemedicine visit as you requested.  This telemedicine visit will be billed to your health insurance, or you, if you are self-insured.  You understand  that you will be responsible for any copayments or coinsurances  that apply to your telemedicine visit.  Before starting our telemedicine visit I am required to get your consent for this virtual visit.  The patient consented.    She is followed for mixed hyperlipidemia and family history of premature cardiac disease  She had a coronary calcium score of 0 in December 2019 with some mild plaque was picked up in the thoracic aorta she was started on statin therapy    She does have a baseline abnormal EKG with anterior T wave inversions  Normal echocardiogram January 2020    Lab work reviewed on rosuvastatin 10  Great response LDL cholesterol at goal total chol 146 66 HDL 60 liver function tests normal   baseline chol 215 and ldl was 137    She has no cardiovascular complaints her main complaint is intermittent edema exacerbated by nonsteroidals  We talked about as needed use of low-dose diuretics                              Patient Active Problem List    Diagnosis Date Noted   • Mixed hyperlipidemia 12/16/2019   • Asthma 12/16/2019   • Family history of premature CAD 12/16/2019   • Abnormal EKG 12/16/2019   • Localized edema 12/16/2019       Medical History:   Past Medical History:   Diagnosis Date   • GERD (gastroesophageal reflux disease)        Surgical History:   Past Surgical History:   Procedure Laterality Date   • CHOLECYSTECTOMY     • FRACTURE SURGERY         Allergies: Hydrocodone-acetaminophen    Current Outpatient Medications   Medication Sig Dispense Refill   • albuterol HFA (VENTOLIN HFA) 90 mcg/actuation inhaler Inhale 2 puffs as needed for wheezing.     • calcium carbonate/vitamin D3  (CALCIUM 600 + D,3, ORAL) Take by mouth daily.     • FLUTICASONE PROPIONATE INHL Inhale 1 puff as needed. Rinse mouth with water after use to reduce aftertaste and incidence of candidiasis. Do not swallow.       • meloxicam (MOBIC) 15 mg tablet Take 15 mg by mouth daily.     • montelukast (SINGULAIR) 10 mg tablet Take by mouth nightly.     • mv-min/iron/folic/calcium/vitK (WOMEN'S MULTIVITAMIN ORAL) Take by mouth daily.     • omeprazole (PriLOSEC) 20 mg capsule Take 20 mg by mouth See admin instr. Patient takes one tablet 4 times a week      • rosuvastatin (CRESTOR) 10 mg tablet Take 1 tablet (10 mg total) by mouth daily. 90 tablet 1     No current facility-administered medications for this visit.        Social History:   Social History     Socioeconomic History   • Marital status:      Spouse name: None   • Number of children: None   • Years of education: None   • Highest education level: None   Occupational History   • None   Social Needs   • Financial resource strain: None   • Food insecurity:     Worry: None     Inability: None   • Transportation needs:     Medical: None     Non-medical: None   Tobacco Use   • Smoking status: Never Smoker   • Smokeless tobacco: Never Used   Substance and Sexual Activity   • Alcohol use: Yes     Alcohol/week: 1.0 standard drinks     Types: 1 Glasses of wine per week   • Drug use: Defer   • Sexual activity: Defer   Lifestyle   • Physical activity:     Days per week: None     Minutes per session: None   • Stress: None   Relationships   • Social connections:     Talks on phone: None     Gets together: None     Attends Tenriism service: None     Active member of club or organization: None     Attends meetings of clubs or organizations: None     Relationship status: None   • Intimate partner violence:     Fear of current or ex partner: None     Emotionally abused: None     Physically abused: None     Forced sexual activity: None   Other Topics Concern   • None   Social History  Narrative   • None       Family History:   Family History   Problem Relation Age of Onset   • Hypertension Biological Mother    • Heart attack Biological Brother    • Heart disease Biological Brother        Review of Systems   Review of Systems   Constitution: Negative for malaise/fatigue, weight gain and weight loss.   HENT: Negative for hearing loss and hoarse voice.    Eyes: Negative for visual disturbance.   Cardiovascular: Positive for leg swelling. Negative for chest pain, claudication, cyanosis, dyspnea on exertion, irregular heartbeat, near-syncope, orthopnea, palpitations, paroxysmal nocturnal dyspnea and syncope.   Respiratory: Negative for cough, hemoptysis, shortness of breath, sleep disturbances due to breathing, snoring, sputum production and wheezing.    Endocrine: Negative for cold intolerance and heat intolerance.   Hematologic/Lymphatic: Negative.  Negative for bleeding problem. Does not bruise/bleed easily.   Skin: Negative.  Negative for rash.   Musculoskeletal: Negative for arthritis, falls, joint pain, muscle cramps and myalgias.   Gastrointestinal: Negative for abdominal pain, anorexia, change in bowel habit, constipation, diarrhea, dysphagia, heartburn, jaundice and nausea.   Genitourinary: Negative for frequency and nocturia.   Neurological: Negative for dizziness, focal weakness, headaches, light-headedness, numbness, tremors and vertigo.   Psychiatric/Behavioral: Negative for memory loss. The patient does not have insomnia and is not nervous/anxious.    Allergic/Immunologic: Negative for hives.       Objective       Vitals:    06/02/20 1455   BP: 127/72       Physical Exam        Labs   Lab Results   Component Value Date    WBC 4.7 05/26/2020    HGB 14.0 05/26/2020    HCT 43.5 05/26/2020     05/26/2020    CHOL 146 05/26/2020    TRIG 116 05/26/2020    HDL 60 05/26/2020    ALT 14 05/26/2020    AST 17 05/26/2020     05/26/2020    K 4.3 05/26/2020     05/26/2020     CREATININE 0.81 05/26/2020    BUN 15 05/26/2020    CO2 29 05/26/2020   LDL66    Imaging      STRESS  Stress echo neg 1/20  CAT  COR VENKATESH SCORE ZERO 12/19 mild plaque thoracic aorta EKG 1/2020  ECG [id number]      Assessment/Plan     Abnormal EKG  Anterior T wave inversions stress echo -January 2020 coronary calcium score 0 that year    Family history of premature CAD  She had brother had bypass surgery at a young age she herself has some plaque in her thoracic aorta seen on coronary calcium score she is treated for hyperlipidemia    Mixed hyperlipidemia  On Crestor LDL cholesterol dropped from 1 37-66 continue Crestor 10 no adverse effects    Localized edema  Exacerbated by nonsteroidals discussed intermittent diuretics        Follow-up in 1 year with lab work  To consider echocardiogram every year or 2 because of baseline abnormal EKG to consider repeat coronary calcium score in 5 years    Telemedicine visit 15 minutes                Telemedicine visit 15 minutes    This letter was generated using speech recognition software.  Please excuse any typographical errors.  Payam Sargent MD  6/2/2020

## 2020-06-01 NOTE — TELEPHONE ENCOUNTER
I called and spoke to pt- She is scheduled for telemed on 6/2/2020.    Pt agreed to a Telemedicine Visit. I reviewed the expectations of the Telemedicine Visit. Pt agrees.

## 2020-06-02 ENCOUNTER — TELEMEDICINE (OUTPATIENT)
Dept: CARDIOLOGY | Facility: CLINIC | Age: 65
End: 2020-06-02
Payer: COMMERCIAL

## 2020-06-02 VITALS
BODY MASS INDEX: 28.12 KG/M2 | SYSTOLIC BLOOD PRESSURE: 127 MMHG | WEIGHT: 175 LBS | HEIGHT: 66 IN | DIASTOLIC BLOOD PRESSURE: 72 MMHG

## 2020-06-02 DIAGNOSIS — Z82.49 FAMILY HISTORY OF PREMATURE CAD: ICD-10-CM

## 2020-06-02 DIAGNOSIS — R94.31 ABNORMAL EKG: ICD-10-CM

## 2020-06-02 DIAGNOSIS — E78.2 MIXED HYPERLIPIDEMIA: Primary | ICD-10-CM

## 2020-06-02 PROCEDURE — 99213 OFFICE O/P EST LOW 20 MIN: CPT | Mod: 95 | Performed by: INTERNAL MEDICINE

## 2020-06-02 RX ORDER — ROSUVASTATIN CALCIUM 10 MG/1
10 TABLET, COATED ORAL DAILY
Qty: 90 TABLET | Refills: 1 | Status: SHIPPED | OUTPATIENT
Start: 2020-06-02 | End: 2021-01-13

## 2020-06-02 NOTE — ASSESSMENT & PLAN NOTE
She had brother had bypass surgery at a young age she herself has some plaque in her thoracic aorta seen on coronary calcium score she is treated for hyperlipidemia

## 2020-06-02 NOTE — LETTER
June 2, 2020     Dayna Mcclellan MD  [address] [place] [address]    Patient: Geraldine Wolff  YOB: 1955  Date of Visit: 6/2/2020      Dear Dr. Mcclellan:    Thank you for referring Geraldine Wolff to me for evaluation. Below are my notes for this consultation.    If you have questions, please do not hesitate to call me. I look forward to following your patient along with you.         Sincerely,        Payam Sargent MD        CC: No Recipients  Payam Sargent MD  6/2/2020  3:07 PM  Sign at close encounter  Cardiology Consult/New Patient    Dayna Mcclellan MD          Geraldine Wolff is a 64 y.o. female identifies as who presents with       You and I are about to have a telemedicine visit as you requested.  This telemedicine visit will be billed to your health insurance, or you, if you are self-insured.  You understand  that you will be responsible for any copayments or coinsurances  that apply to your telemedicine visit.  Before starting our telemedicine visit I am required to get your consent for this virtual visit.  The patient consented.    She is followed for mixed hyperlipidemia and family history of premature cardiac disease  She had a coronary calcium score of 0 in December 2019 with some mild plaque was picked up in the thoracic aorta she was started on statin therapy    She does have a baseline abnormal EKG with anterior T wave inversions  Normal echocardiogram January 2020    Lab work reviewed on rosuvastatin 10  Great response LDL cholesterol at goal total chol 146 66 HDL 60 liver function tests normal   baseline chol 215 and ldl was 137    She has no cardiovascular complaints her main complaint is intermittent edema exacerbated by nonsteroidals  We talked about as needed use of low-dose diuretics                              Patient Active Problem List    Diagnosis Date Noted   • Mixed hyperlipidemia 12/16/2019   • Asthma 12/16/2019   • Family history of  premature CAD 12/16/2019   • Abnormal EKG 12/16/2019   • Localized edema 12/16/2019       Medical History:   Past Medical History:   Diagnosis Date   • GERD (gastroesophageal reflux disease)        Surgical History:   Past Surgical History:   Procedure Laterality Date   • CHOLECYSTECTOMY     • FRACTURE SURGERY         Allergies: Hydrocodone-acetaminophen    Current Outpatient Medications   Medication Sig Dispense Refill   • albuterol HFA (VENTOLIN HFA) 90 mcg/actuation inhaler Inhale 2 puffs as needed for wheezing.     • calcium carbonate/vitamin D3 (CALCIUM 600 + D,3, ORAL) Take by mouth daily.     • FLUTICASONE PROPIONATE INHL Inhale 1 puff as needed. Rinse mouth with water after use to reduce aftertaste and incidence of candidiasis. Do not swallow.       • meloxicam (MOBIC) 15 mg tablet Take 15 mg by mouth daily.     • montelukast (SINGULAIR) 10 mg tablet Take by mouth nightly.     • mv-min/iron/folic/calcium/vitK (WOMEN'S MULTIVITAMIN ORAL) Take by mouth daily.     • omeprazole (PriLOSEC) 20 mg capsule Take 20 mg by mouth See admin instr. Patient takes one tablet 4 times a week      • rosuvastatin (CRESTOR) 10 mg tablet Take 1 tablet (10 mg total) by mouth daily. 90 tablet 1     No current facility-administered medications for this visit.        Social History:   Social History     Socioeconomic History   • Marital status:      Spouse name: None   • Number of children: None   • Years of education: None   • Highest education level: None   Occupational History   • None   Social Needs   • Financial resource strain: None   • Food insecurity:     Worry: None     Inability: None   • Transportation needs:     Medical: None     Non-medical: None   Tobacco Use   • Smoking status: Never Smoker   • Smokeless tobacco: Never Used   Substance and Sexual Activity   • Alcohol use: Yes     Alcohol/week: 1.0 standard drinks     Types: 1 Glasses of wine per week   • Drug use: Defer   • Sexual activity: Defer   Lifestyle   •  Physical activity:     Days per week: None     Minutes per session: None   • Stress: None   Relationships   • Social connections:     Talks on phone: None     Gets together: None     Attends Zoroastrian service: None     Active member of club or organization: None     Attends meetings of clubs or organizations: None     Relationship status: None   • Intimate partner violence:     Fear of current or ex partner: None     Emotionally abused: None     Physically abused: None     Forced sexual activity: None   Other Topics Concern   • None   Social History Narrative   • None       Family History:   Family History   Problem Relation Age of Onset   • Hypertension Biological Mother    • Heart attack Biological Brother    • Heart disease Biological Brother        Review of Systems   Review of Systems   Constitution: Negative for malaise/fatigue, weight gain and weight loss.   HENT: Negative for hearing loss and hoarse voice.    Eyes: Negative for visual disturbance.   Cardiovascular: Positive for leg swelling. Negative for chest pain, claudication, cyanosis, dyspnea on exertion, irregular heartbeat, near-syncope, orthopnea, palpitations, paroxysmal nocturnal dyspnea and syncope.   Respiratory: Negative for cough, hemoptysis, shortness of breath, sleep disturbances due to breathing, snoring, sputum production and wheezing.    Endocrine: Negative for cold intolerance and heat intolerance.   Hematologic/Lymphatic: Negative.  Negative for bleeding problem. Does not bruise/bleed easily.   Skin: Negative.  Negative for rash.   Musculoskeletal: Negative for arthritis, falls, joint pain, muscle cramps and myalgias.   Gastrointestinal: Negative for abdominal pain, anorexia, change in bowel habit, constipation, diarrhea, dysphagia, heartburn, jaundice and nausea.   Genitourinary: Negative for frequency and nocturia.   Neurological: Negative for dizziness, focal weakness, headaches, light-headedness, numbness, tremors and vertigo.    Psychiatric/Behavioral: Negative for memory loss. The patient does not have insomnia and is not nervous/anxious.    Allergic/Immunologic: Negative for hives.       Objective       Vitals:    06/02/20 1455   BP: 127/72       Physical Exam        Labs   Lab Results   Component Value Date    WBC 4.7 05/26/2020    HGB 14.0 05/26/2020    HCT 43.5 05/26/2020     05/26/2020    CHOL 146 05/26/2020    TRIG 116 05/26/2020    HDL 60 05/26/2020    ALT 14 05/26/2020    AST 17 05/26/2020     05/26/2020    K 4.3 05/26/2020     05/26/2020    CREATININE 0.81 05/26/2020    BUN 15 05/26/2020    CO2 29 05/26/2020   LDL66    Imaging      STRESS  Stress echo neg 1/20  CAT  COR VENKATESH SCORE ZERO 12/19 mild plaque thoracic aorta EKG 1/2020  ECG [id number]      Assessment/Plan     Abnormal EKG  Anterior T wave inversions stress echo -January 2020 coronary calcium score 0 that year    Family history of premature CAD  She had brother had bypass surgery at a young age she herself has some plaque in her thoracic aorta seen on coronary calcium score she is treated for hyperlipidemia    Mixed hyperlipidemia  On Crestor LDL cholesterol dropped from 1 37-66 continue Crestor 10 no adverse effects    Localized edema  Exacerbated by nonsteroidals discussed intermittent diuretics        Follow-up in 1 year with lab work  To consider echocardiogram every year or 2 because of baseline abnormal EKG to consider repeat coronary calcium score in 5 years    Telemedicine visit 15 minutes                Telemedicine visit 15 minutes    This letter was generated using speech recognition software.  Please excuse any typographical errors.  Payam Sargent MD  6/2/2020

## 2020-09-15 ENCOUNTER — TELEPHONE (OUTPATIENT)
Dept: SCHEDULING | Facility: CLINIC | Age: 65
End: 2020-09-15

## 2020-09-15 NOTE — TELEPHONE ENCOUNTER
Aretha, with Dr Mcclellan's office, calling to have last OVN  And labs faxed to their office.  Both were sent.             Dr Mcclellan-Aretha        [phone]        [phone]

## 2020-10-19 ENCOUNTER — TRANSCRIBE ORDERS (OUTPATIENT)
Dept: ADMINISTRATIVE | Facility: HOSPITAL | Age: 65
End: 2020-10-19

## 2020-10-19 DIAGNOSIS — Z12.31 ENCOUNTER FOR SCREENING MAMMOGRAM FOR MALIGNANT NEOPLASM OF BREAST: Primary | ICD-10-CM

## 2020-12-07 ENCOUNTER — HOSPITAL ENCOUNTER (OUTPATIENT)
Dept: MAMMOGRAPHY | Facility: CLINIC | Age: 65
Discharge: HOME | End: 2020-12-07
Payer: COMMERCIAL

## 2020-12-07 VITALS — WEIGHT: 180 LBS | BODY MASS INDEX: 28.25 KG/M2 | HEIGHT: 67 IN

## 2020-12-07 DIAGNOSIS — Z12.31 ENCOUNTER FOR SCREENING MAMMOGRAM FOR MALIGNANT NEOPLASM OF BREAST: ICD-10-CM

## 2020-12-07 PROCEDURE — 77067 SCR MAMMO BI INCL CAD: CPT

## 2020-12-07 PROCEDURE — 77063 BREAST TOMOSYNTHESIS BI: CPT

## 2021-03-30 DIAGNOSIS — Z23 ENCOUNTER FOR IMMUNIZATION: ICD-10-CM

## 2021-05-08 NOTE — ASSESSMENT & PLAN NOTE
Baseline EKG abnormal with anterior T wave inversion she does remember being told anything about her EKG being abnormal before possible normal variant evaluate with stress echo   (4) no limitation

## 2021-06-01 ASSESSMENT — ENCOUNTER SYMPTOMS
PND: 0
WHEEZING: 0
FREQUENCY: 0
SNORING: 0
BRUISES/BLEEDS EASILY: 0
WEIGHT GAIN: 0
FALLS: 0
CHANGE IN BOWEL HABIT: 0
HEMATOLOGIC/LYMPHATIC NEGATIVE: 1
NERVOUS/ANXIOUS: 0
MUSCLE CRAMPS: 0
NEAR-SYNCOPE: 0
NAUSEA: 0
LIGHT-HEADEDNESS: 0
INSOMNIA: 0
SLEEP DISTURBANCES DUE TO BREATHING: 0
SPUTUM PRODUCTION: 0
CONSTIPATION: 0
SHORTNESS OF BREATH: 0
TREMORS: 0
VERTIGO: 0
DIARRHEA: 0
ABDOMINAL PAIN: 0
IRREGULAR HEARTBEAT: 0
DYSPNEA ON EXERTION: 0
ORTHOPNEA: 0
SYNCOPE: 0
JAUNDICE: 0
FOCAL WEAKNESS: 0
HOARSE VOICE: 0
HEMOPTYSIS: 0
MYALGIAS: 0
DIZZINESS: 0
MEMORY LOSS: 0
HEADACHES: 0
HEARTBURN: 0
NUMBNESS: 0
PALPITATIONS: 0
COUGH: 0
ANOREXIA: 0
WEIGHT LOSS: 0
CLAUDICATION: 0

## 2021-06-01 NOTE — PROGRESS NOTES
Cardiology Consult/New Patient    Dayna Mcclellan MD          Geraldine Wolff is a 65 y.o. female identifies as who presents with       Newly on hctz you had started her on for bp  This is a great choice for her because she has a history of intermittent peripheral edema  She is followed for an abnormal EKG and family history premature cardiac disease  She had a coronary calcium score of 0 in 2020  She had minimal plaque seen on her thoracic aorta with coronary calcium score in 2020  Normal stress echo jan 2020  She is treated for hyperlipidemia  sHe has localized peripheral edema exacerbated by nonsteroidals      Lab work April 2021 CBC normal  Creatinine 0.74 potassium 4.2 cholesterol LDL 87- it was 66  Thyroids normal              0                          Patient Active Problem List    Diagnosis Date Noted   • Essential hypertension 06/04/2021   • Mixed hyperlipidemia 12/16/2019   • Asthma 12/16/2019   • Family history of premature CAD 12/16/2019   • Abnormal EKG 12/16/2019   • Localized edema 12/16/2019       Medical History:   Past Medical History:   Diagnosis Date   • GERD (gastroesophageal reflux disease)        Surgical History:   Past Surgical History:   Procedure Laterality Date   • CHOLECYSTECTOMY     • FRACTURE SURGERY         Allergies: Hydrocodone-acetaminophen    Current Outpatient Medications   Medication Sig Dispense Refill   • albuterol HFA (VENTOLIN HFA) 90 mcg/actuation inhaler Inhale 2 puffs as needed for wheezing.     • calcium carbonate/vitamin D3 (CALCIUM 600 + D,3, ORAL) Take by mouth daily.     • cholecalciferol, vitamin D3, 5,000 unit (125 mcg) capsule Take 5,000 Units by mouth daily.     • coenzyme Q10 (COQ10) 100 mg capsule Take 100 mg by mouth daily.     • fluticasone propionate (FLONASE) 50 mcg/actuation nasal spray Administer 1 spray into each nostril daily.     • hydrochlorothiazide (HYDRODIURIL) 25 mg tablet      • meloxicam (MOBIC) 15 mg tablet Take 15 mg by mouth daily.     •  montelukast (SINGULAIR) 10 mg tablet Take by mouth nightly.     • mv-min/iron/folic/calcium/vitK (WOMEN'S MULTIVITAMIN ORAL) Take by mouth daily.     • omeprazole (PriLOSEC) 20 mg capsule Take 20 mg by mouth See admin instr. Patient takes one tablet 4 times a week      • rosuvastatin (CRESTOR) 10 mg tablet TAKE ONE TABLET BY MOUTH EVERY DAY 90 tablet 3     No current facility-administered medications for this visit.       Social History:   Social History     Socioeconomic History   • Marital status:      Spouse name: None   • Number of children: None   • Years of education: None   • Highest education level: None   Occupational History   • None   Tobacco Use   • Smoking status: Never Smoker   • Smokeless tobacco: Never Used   Substance and Sexual Activity   • Alcohol use: Yes     Alcohol/week: 1.0 standard drinks     Types: 1 Glasses of wine per week   • Drug use: Defer   • Sexual activity: Defer   Other Topics Concern   • None   Social History Narrative   • None     Social Determinants of Health     Financial Resource Strain:    • Difficulty of Paying Living Expenses:    Food Insecurity:    • Worried About Running Out of Food in the Last Year:    • Ran Out of Food in the Last Year:    Transportation Needs:    • Lack of Transportation (Medical):    • Lack of Transportation (Non-Medical):    Physical Activity:    • Days of Exercise per Week:    • Minutes of Exercise per Session:    Stress:    • Feeling of Stress :    Social Connections:    • Frequency of Communication with Friends and Family:    • Frequency of Social Gatherings with Friends and Family:    • Attends Orthodox Services:    • Active Member of Clubs or Organizations:    • Attends Club or Organization Meetings:    • Marital Status:    Intimate Partner Violence:    • Fear of Current or Ex-Partner:    • Emotionally Abused:    • Physically Abused:    • Sexually Abused:        Family History:   Family History   Problem Relation Age of Onset   •  Hypertension Biological Mother    • Heart attack Biological Brother    • Heart disease Biological Brother        Review of Systems   Review of Systems   Constitutional: Negative for malaise/fatigue, weight gain and weight loss.   HENT: Negative for hearing loss and hoarse voice.    Eyes: Negative for visual disturbance.   Cardiovascular: Positive for leg swelling. Negative for chest pain, claudication, cyanosis, dyspnea on exertion, irregular heartbeat, near-syncope, orthopnea, palpitations, paroxysmal nocturnal dyspnea and syncope.   Respiratory: Negative for cough, hemoptysis, shortness of breath, sleep disturbances due to breathing, snoring, sputum production and wheezing.    Endocrine: Negative for cold intolerance and heat intolerance.   Hematologic/Lymphatic: Negative.  Negative for bleeding problem. Does not bruise/bleed easily.   Skin: Negative.  Negative for rash.   Musculoskeletal: Negative for arthritis, falls, joint pain, muscle cramps and myalgias.   Gastrointestinal: Negative for abdominal pain, anorexia, change in bowel habit, constipation, diarrhea, dysphagia, heartburn, jaundice and nausea.   Genitourinary: Negative for frequency and nocturia.   Neurological: Negative for dizziness, focal weakness, headaches, light-headedness, numbness, tremors and vertigo.   Psychiatric/Behavioral: Negative for memory loss. The patient does not have insomnia and is not nervous/anxious.    Allergic/Immunologic: Negative for hives.       Objective       Vitals:    06/04/21 1059   BP: 124/78   Pulse: 78   SpO2: 96%       Physical Exam     Labs   Lab Results   Component Value Date    WBC 4.7 05/26/2020    HGB 14.0 05/26/2020    HCT 43.5 05/26/2020     05/26/2020    CHOL 146 05/26/2020    TRIG 116 05/26/2020    HDL 60 05/26/2020    ALT 14 05/26/2020    AST 17 05/26/2020     05/26/2020    K 4.3 05/26/2020     05/26/2020    CREATININE 0.81 05/26/2020    BUN 15 05/26/2020    CO2 29 05/26/2020    [id number]    Imaging      STRESS  Stress echo neg 1/20    CAT  COR VENKATESH SCORE ZERO 12/19 mild plaque thoracic aorta EKG 1/2020 June 2021      ECG [id number]      Assessment/Plan     Essential hypertension  You recently started on diuretic therapy she is doing well with it she has a history of intermittent edema so this should be a great choice for her    Mixed hyperlipidemia  She has some mild plaquing of thoracic aorta seen on coronary calcium score.  Cholesterol was 60 crept up a little to 87 on rosuvastatin 10 titrate up to 20 if not at goal next visit 6 months    Localized edema  History of edema sensitive to nonsteroidals now on hydrochlorothiazide for blood pressure doing well          She is followed for hypertension you have recently put on hydrochlorothiazide she has minimal plaque in her thoracic aorta no known obstructive CAD    Her cholesterol crept up a little bit with her history of mild plaque in her thoracic aorta I would like her numbers to be perfect will recheck in 6 months if LDL over 70 will titrate up rosuvastatin follow-up in 6 months with lab work alone remains on Crestor 10 for now doing well with hydrochlorothiazide that he was started repeat coronary calcium score in about 5 years            This letter was generated using speech recognition software.  Please excuse any typographical errors.  Payam Sargent MD  6/4/2021

## 2021-06-04 ENCOUNTER — OFFICE VISIT (OUTPATIENT)
Dept: CARDIOLOGY | Facility: CLINIC | Age: 66
End: 2021-06-04
Payer: COMMERCIAL

## 2021-06-04 VITALS
HEIGHT: 66 IN | OXYGEN SATURATION: 96 % | WEIGHT: 180 LBS | BODY MASS INDEX: 28.93 KG/M2 | HEART RATE: 78 BPM | SYSTOLIC BLOOD PRESSURE: 124 MMHG | DIASTOLIC BLOOD PRESSURE: 78 MMHG

## 2021-06-04 DIAGNOSIS — R94.31 ABNORMAL EKG: Primary | ICD-10-CM

## 2021-06-04 DIAGNOSIS — Z82.49 FAMILY HISTORY OF PREMATURE CAD: ICD-10-CM

## 2021-06-04 DIAGNOSIS — E78.2 MIXED HYPERLIPIDEMIA: ICD-10-CM

## 2021-06-04 PROBLEM — I10 ESSENTIAL HYPERTENSION: Status: ACTIVE | Noted: 2021-06-04

## 2021-06-04 PROCEDURE — 99214 OFFICE O/P EST MOD 30 MIN: CPT | Performed by: INTERNAL MEDICINE

## 2021-06-04 PROCEDURE — 93000 ELECTROCARDIOGRAM COMPLETE: CPT | Performed by: INTERNAL MEDICINE

## 2021-06-04 PROCEDURE — 3008F BODY MASS INDEX DOCD: CPT | Performed by: INTERNAL MEDICINE

## 2021-06-04 PROCEDURE — 3078F DIAST BP <80 MM HG: CPT | Performed by: INTERNAL MEDICINE

## 2021-06-04 PROCEDURE — 3074F SYST BP LT 130 MM HG: CPT | Performed by: INTERNAL MEDICINE

## 2021-06-04 RX ORDER — HYDROCHLOROTHIAZIDE 25 MG/1
25 TABLET ORAL DAILY
COMMUNITY
Start: 2021-04-01

## 2021-06-04 RX ORDER — FLUTICASONE PROPIONATE 50 MCG
1 SPRAY, SUSPENSION (ML) NASAL DAILY
COMMUNITY

## 2021-06-04 RX ORDER — VIT C/E/ZN/COPPR/LUTEIN/ZEAXAN 250MG-90MG
5000 CAPSULE ORAL DAILY
COMMUNITY

## 2021-06-04 RX ORDER — UBIDECARENONE 100 MG
100 CAPSULE ORAL DAILY
COMMUNITY

## 2021-06-04 RX ORDER — HYDROCHLOROTHIAZIDE 25 MG/1
25 TABLET ORAL DAILY
COMMUNITY
End: 2021-06-04

## 2021-06-04 NOTE — ASSESSMENT & PLAN NOTE
You recently started on diuretic therapy she is doing well with it she has a history of intermittent edema so this should be a great choice for her

## 2021-06-04 NOTE — ASSESSMENT & PLAN NOTE
History of edema sensitive to nonsteroidals now on hydrochlorothiazide for blood pressure doing well

## 2021-06-04 NOTE — ASSESSMENT & PLAN NOTE
She has some mild plaquing of thoracic aorta seen on coronary calcium score.  Cholesterol was 60 crept up a little to 87 on rosuvastatin 10 titrate up to 20 if not at goal next visit 6 months

## 2021-11-25 LAB
ALBUMIN SERPL-MCNC: 4.4 G/DL (ref 3.6–5.1)
ALBUMIN/GLOB SERPL: 1.8 (CALC) (ref 1–2.5)
ALP SERPL-CCNC: 66 U/L (ref 37–153)
ALT SERPL-CCNC: 16 U/L (ref 6–29)
AST SERPL-CCNC: 21 U/L (ref 10–35)
BASOPHILS # BLD AUTO: 38 CELLS/UL (ref 0–200)
BASOPHILS NFR BLD AUTO: 0.9 %
BILIRUB SERPL-MCNC: 0.6 MG/DL (ref 0.2–1.2)
BNP SERPL-MCNC: 14 PG/ML
BUN SERPL-MCNC: 17 MG/DL (ref 7–25)
BUN/CREAT SERPL: ABNORMAL (CALC) (ref 6–22)
CALCIUM SERPL-MCNC: 9.6 MG/DL (ref 8.6–10.4)
CHLORIDE SERPL-SCNC: 100 MMOL/L (ref 98–110)
CHOLEST SERPL-MCNC: 190 MG/DL
CHOLEST/HDLC SERPL: 3.2 (CALC)
CO2 SERPL-SCNC: 34 MMOL/L (ref 20–32)
CREAT SERPL-MCNC: 0.74 MG/DL (ref 0.5–0.99)
EOSINOPHIL # BLD AUTO: 176 CELLS/UL (ref 15–500)
EOSINOPHIL NFR BLD AUTO: 4.2 %
ERYTHROCYTE [DISTWIDTH] IN BLOOD BY AUTOMATED COUNT: 12 % (ref 11–15)
GLOBULIN SER CALC-MCNC: 2.5 G/DL (CALC) (ref 1.9–3.7)
GLUCOSE SERPL-MCNC: 94 MG/DL (ref 65–99)
HCT VFR BLD AUTO: 42.5 % (ref 35–45)
HDLC SERPL-MCNC: 60 MG/DL
HGB BLD-MCNC: 14.4 G/DL (ref 11.7–15.5)
LDLC SERPL CALC-MCNC: 98 MG/DL (CALC)
LYMPHOCYTES # BLD AUTO: 1445 CELLS/UL (ref 850–3900)
LYMPHOCYTES NFR BLD AUTO: 34.4 %
MCH RBC QN AUTO: 32.8 PG (ref 27–33)
MCHC RBC AUTO-ENTMCNC: 33.9 G/DL (ref 32–36)
MCV RBC AUTO: 96.8 FL (ref 80–100)
MONOCYTES # BLD AUTO: 386 CELLS/UL (ref 200–950)
MONOCYTES NFR BLD AUTO: 9.2 %
NEUTROPHILS # BLD AUTO: 2155 CELLS/UL (ref 1500–7800)
NEUTROPHILS NFR BLD AUTO: 51.3 %
NONHDLC SERPL-MCNC: 130 MG/DL (CALC)
PLATELET # BLD AUTO: 232 THOUSAND/UL (ref 140–400)
PMV BLD REES-ECKER: 9.8 FL (ref 7.5–12.5)
POTASSIUM SERPL-SCNC: 3.6 MMOL/L (ref 3.5–5.3)
PROT SERPL-MCNC: 6.9 G/DL (ref 6.1–8.1)
QUEST EGFR AFRICAN AMERICAN: 99 ML/MIN/1.73M2
QUEST EGFR NON-AFR. AMERICAN: 86 ML/MIN/1.73M2
RBC # BLD AUTO: 4.39 MILLION/UL (ref 3.8–5.1)
SODIUM SERPL-SCNC: 140 MMOL/L (ref 135–146)
TRIGL SERPL-MCNC: 197 MG/DL
WBC # BLD AUTO: 4.2 THOUSAND/UL (ref 3.8–10.8)

## 2021-11-30 PROBLEM — I73.9 PVD (PERIPHERAL VASCULAR DISEASE) (CMS/HCC): Status: ACTIVE | Noted: 2021-11-30

## 2021-11-30 ASSESSMENT — ENCOUNTER SYMPTOMS
IRREGULAR HEARTBEAT: 0
PALPITATIONS: 0
LIGHT-HEADEDNESS: 0
NERVOUS/ANXIOUS: 0
HEARTBURN: 0
COUGH: 0
MEMORY LOSS: 0
NEAR-SYNCOPE: 0
SYNCOPE: 0
CLAUDICATION: 0
HEMATOLOGIC/LYMPHATIC NEGATIVE: 1
HOARSE VOICE: 0
INSOMNIA: 0
DIZZINESS: 0
SPUTUM PRODUCTION: 0
FOCAL WEAKNESS: 0
JAUNDICE: 0
PND: 0
DIARRHEA: 0
CHANGE IN BOWEL HABIT: 0
NUMBNESS: 0
SLEEP DISTURBANCES DUE TO BREATHING: 0
BRUISES/BLEEDS EASILY: 0
HEMOPTYSIS: 0
HEADACHES: 0
CONSTIPATION: 0
WEIGHT GAIN: 0
VERTIGO: 0
SHORTNESS OF BREATH: 0
NAUSEA: 0
MYALGIAS: 0
FREQUENCY: 0
SNORING: 0
FALLS: 0
ORTHOPNEA: 0
MUSCLE CRAMPS: 0
WHEEZING: 0
DYSPNEA ON EXERTION: 0
ABDOMINAL PAIN: 0
WEIGHT LOSS: 0
ANOREXIA: 0
TREMORS: 0

## 2021-11-30 NOTE — PROGRESS NOTES
Cardiology Consult/New Patient    Dayna Mcclellan MD          Geraldine Wolff is a 65 y.o. female identifies as who presents with     She returns for follow-up and to check her lab work  She has mixed hyperlipidemia  Her cholesterol crept up a little bit I wanted to recheck it if not at goal increase her rosuvastatin  She has hypertension  Mild plaque in the thoracic aorta seen at the time of her coronary calcium score December 2019 which itself was 0  Cholesterol climbed up a little bit and triglycerides were also elevated      Lab work November 2021  Cholesterol 190  HDL 60 triglycerides 197    LDL 98  Patient Active Problem List    Diagnosis Date Noted   • PVD (peripheral vascular disease) (CMS/[facility]) 11/30/2021   • Essential hypertension 06/04/2021   • Mixed hyperlipidemia 12/16/2019   • Asthma 12/16/2019   • Family history of premature CAD 12/16/2019   • Abnormal EKG 12/16/2019   • Localized edema 12/16/2019       Medical History:   Past Medical History:   Diagnosis Date   • GERD (gastroesophageal reflux disease)        Surgical History:   Past Surgical History:   Procedure Laterality Date   • CHOLECYSTECTOMY     • FRACTURE SURGERY         Allergies: Hydrocodone-acetaminophen    Current Outpatient Medications   Medication Sig Dispense Refill   • albuterol HFA (VENTOLIN HFA) 90 mcg/actuation inhaler Inhale 2 puffs as needed for wheezing.     • calcium carbonate/vitamin D3 (CALCIUM 600 + D,3, ORAL) Take by mouth daily.     • cholecalciferol, vitamin D3, 5,000 unit (125 mcg) capsule Take 5,000 Units by mouth daily.     • coenzyme Q10 (COQ10) 100 mg capsule Take 100 mg by mouth daily.     • fluticasone propionate (FLONASE) 50 mcg/actuation nasal spray Administer 1 spray into each nostril daily.     • hydrochlorothiazide (HYDRODIURIL) 25 mg tablet Take 25 mg by mouth daily.       • meloxicam (MOBIC) 15 mg tablet Take 15 mg by mouth daily.     • montelukast (SINGULAIR) 10 mg tablet Take by mouth nightly.     •  mv-min/iron/folic/calcium/vitK (WOMEN'S MULTIVITAMIN ORAL) Take by mouth daily.     • omeprazole (PriLOSEC) 20 mg capsule Take 20 mg by mouth See admin instr. Patient takes one tablet 4 times a week      • rosuvastatin 20 mg tablet Take 1 tablet (20 mg total) by mouth daily. 90 tablet 1     No current facility-administered medications for this visit.       Social History:   Social History     Socioeconomic History   • Marital status:      Spouse name: None   • Number of children: None   • Years of education: None   • Highest education level: None   Occupational History   • None   Tobacco Use   • Smoking status: Never Smoker   • Smokeless tobacco: Never Used   Substance and Sexual Activity   • Alcohol use: Yes     Alcohol/week: 7.0 standard drinks     Types: 7 Glasses of wine per week   • Drug use: Defer   • Sexual activity: Defer   Other Topics Concern   • None   Social History Narrative   • None     Social Determinants of Health     Financial Resource Strain: Not on file   Food Insecurity: Not on file   Transportation Needs: Not on file   Physical Activity: Not on file   Stress: Not on file   Social Connections: Not on file   Intimate Partner Violence: Not on file   Housing Stability: Not on file       Family History:   Family History   Problem Relation Age of Onset   • Hypertension Biological Mother    • Heart attack Biological Brother    • Heart disease Biological Brother    • Colon cancer Nephew        Review of Systems   Review of Systems   Constitutional: Negative for malaise/fatigue, weight gain and weight loss.   HENT: Negative for hearing loss and hoarse voice.    Eyes: Negative for visual disturbance.   Cardiovascular: Positive for leg swelling. Negative for chest pain, claudication, cyanosis, dyspnea on exertion, irregular heartbeat, near-syncope, orthopnea, palpitations, paroxysmal nocturnal dyspnea and syncope.   Respiratory: Negative for cough, hemoptysis, shortness of breath, sleep disturbances  due to breathing, snoring, sputum production and wheezing.    Endocrine: Negative for cold intolerance and heat intolerance.   Hematologic/Lymphatic: Negative.  Negative for bleeding problem. Does not bruise/bleed easily.   Skin: Negative.  Negative for rash.   Musculoskeletal: Negative for arthritis, falls, joint pain, muscle cramps and myalgias.   Gastrointestinal: Negative for abdominal pain, anorexia, change in bowel habit, constipation, diarrhea, dysphagia, heartburn, jaundice and nausea.   Genitourinary: Negative for frequency and nocturia.   Neurological: Negative for dizziness, focal weakness, headaches, light-headedness, numbness, tremors and vertigo.   Psychiatric/Behavioral: Negative for memory loss. The patient does not have insomnia and is not nervous/anxious.    Allergic/Immunologic: Negative for hives.       Objective       Vitals:    12/10/21 1106   BP: 112/82   Pulse: 78   SpO2: 98%       Physical Exam     Labs   Lab Results   Component Value Date    WBC 4.2 11/24/2021    HGB 14.4 11/24/2021    HCT 42.5 11/24/2021     11/24/2021    CHOL 190 11/24/2021    TRIG 197 (H) 11/24/2021    HDL 60 11/24/2021    ALT 16 11/24/2021    AST 21 11/24/2021     11/24/2021    K 3.6 11/24/2021     11/24/2021    CREATININE 0.74 11/24/2021    BUN 17 11/24/2021    CO2 34 (H) 11/24/2021   LDL66    Imaging      STRESS  Stress echo neg 1/20    CAT  COR VENKATESH SCORE ZERO 12/19 mild plaque thoracic aorta EKG 1/2020 dec2021 June 2021            Assessment/Plan     Essential hypertension  Controlled on HydroDIURIL no nonobstructive CAD with coronary calcium score of 0 2019  Mild plaque seen in thoracic aorta without test  Negative stress echocardiogram January 2020    Mixed hyperlipidemia  Numbers creeping up LDL cholesterol 98 triglycerides 197  Went over some dietary changes increase rosuvastatin to 20 mg  Lab work thyroid 6 months          She is followed for hypertension you have recently put on  hydrochlorothiazide she has minimal plaque in her thoracic aorta no known obstructive CAD  I increased her rosuvastatin 20 mg recheck lab work including thyroids and A1c  Follow-up in 6 months  Repeat coronary calcium score 2024            This letter was generated using speech recognition software.  Please excuse any typographical errors.  Payam Sargent MD  12/10/2021

## 2021-12-10 ENCOUNTER — OFFICE VISIT (OUTPATIENT)
Dept: CARDIOLOGY | Facility: CLINIC | Age: 66
End: 2021-12-10
Payer: MEDICARE

## 2021-12-10 ENCOUNTER — TELEPHONE (OUTPATIENT)
Dept: CARDIOLOGY | Facility: CLINIC | Age: 66
End: 2021-12-10

## 2021-12-10 VITALS
HEART RATE: 78 BPM | DIASTOLIC BLOOD PRESSURE: 82 MMHG | BODY MASS INDEX: 29.41 KG/M2 | SYSTOLIC BLOOD PRESSURE: 112 MMHG | HEIGHT: 66 IN | WEIGHT: 183 LBS | OXYGEN SATURATION: 98 %

## 2021-12-10 DIAGNOSIS — I10 ESSENTIAL HYPERTENSION: Primary | ICD-10-CM

## 2021-12-10 DIAGNOSIS — E78.2 MIXED HYPERLIPIDEMIA: Primary | ICD-10-CM

## 2021-12-10 DIAGNOSIS — Z82.49 FAMILY HISTORY OF PREMATURE CAD: ICD-10-CM

## 2021-12-10 DIAGNOSIS — E78.2 MIXED HYPERLIPIDEMIA: ICD-10-CM

## 2021-12-10 DIAGNOSIS — R73.9 HYPERGLYCEMIA: ICD-10-CM

## 2021-12-10 PROCEDURE — 93000 ELECTROCARDIOGRAM COMPLETE: CPT | Performed by: INTERNAL MEDICINE

## 2021-12-10 PROCEDURE — 99214 OFFICE O/P EST MOD 30 MIN: CPT | Mod: 25 | Performed by: INTERNAL MEDICINE

## 2021-12-10 PROCEDURE — 200200 PR NO CHARGE: Performed by: INTERNAL MEDICINE

## 2021-12-10 RX ORDER — ROSUVASTATIN CALCIUM 20 MG/1
10 TABLET, COATED ORAL
Qty: 90 TABLET | Refills: 3 | Status: SHIPPED | OUTPATIENT
Start: 2021-12-10 | End: 2021-12-10

## 2021-12-10 RX ORDER — ROSUVASTATIN CALCIUM 20 MG/1
20 TABLET, COATED ORAL DAILY
Qty: 90 TABLET | Refills: 1 | Status: SHIPPED | OUTPATIENT
Start: 2021-12-10 | End: 2022-06-13 | Stop reason: SDUPTHER

## 2021-12-10 NOTE — ASSESSMENT & PLAN NOTE
Numbers creeping up LDL cholesterol 98 triglycerides 197  Went over some dietary changes increase rosuvastatin to 20 mg  Lab work thyroid 6 months

## 2021-12-10 NOTE — ASSESSMENT & PLAN NOTE
Controlled on HydroDIURIL no nonobstructive CAD with coronary calcium score of 0 2019  Mild plaque seen in thoracic aorta without test  Negative stress echocardiogram January 2020

## 2021-12-10 NOTE — TELEPHONE ENCOUNTER
----- Message from Geraldine Wolff sent at 12/10/2021 12:27 PM EST -----  Regarding: Thyroid bloodwork  Hi Dr Sargent, I just saw you this morning and you recommended getting my thyroid checked since my cholesterol was raised. Could I do this now instead of waiting until my 6 month bloodwork? Can you send a lab order to [facility] for me to do it sooner?  Thank you.  Giuliana Wolff

## 2021-12-15 LAB — TSH SERPL-ACNC: 1.65 MIU/L (ref 0.4–4.5)

## 2022-01-12 ENCOUNTER — HOSPITAL ENCOUNTER (OUTPATIENT)
Dept: MAMMOGRAPHY | Facility: IMAGING CENTER | Age: 67
Discharge: HOME | End: 2022-01-12
Payer: MEDICARE

## 2022-01-12 ENCOUNTER — HOSPITAL ENCOUNTER (OUTPATIENT)
Dept: BONE DENSITY | Facility: IMAGING CENTER | Age: 67
Discharge: HOME | End: 2022-01-12
Payer: MEDICARE

## 2022-01-12 VITALS — BODY MASS INDEX: 27.94 KG/M2 | WEIGHT: 178 LBS | HEIGHT: 67 IN

## 2022-01-12 DIAGNOSIS — E28.39 OTHER PRIMARY OVARIAN FAILURE: ICD-10-CM

## 2022-01-12 DIAGNOSIS — Z12.31 ENCOUNTER FOR SCREENING MAMMOGRAM FOR MALIGNANT NEOPLASM OF BREAST: ICD-10-CM

## 2022-01-12 DIAGNOSIS — E28.39 ESTROGEN DEFICIENCY: ICD-10-CM

## 2022-01-12 PROCEDURE — 77080 DXA BONE DENSITY AXIAL: CPT

## 2022-01-12 PROCEDURE — 77063 BREAST TOMOSYNTHESIS BI: CPT

## 2022-01-12 PROCEDURE — 77067 SCR MAMMO BI INCL CAD: CPT

## 2022-03-04 ENCOUNTER — TELEPHONE (OUTPATIENT)
Dept: SCHEDULING | Facility: CLINIC | Age: 67
End: 2022-03-04
Payer: COMMERCIAL

## 2022-03-04 NOTE — TELEPHONE ENCOUNTER
[facility] called stating the labs drawn on 11/24 are being denied because it was not medically necessary.     Please call provider support at   [phone] to assist in getting these labs covered. ty

## 2022-03-04 NOTE — TELEPHONE ENCOUNTER
Claim#[id number]  Total Charge amount: $495.39    I called but after multiple transfer and on hold for 45 minutes I just abled to get the Claim # and total charge amount. Office is closed now so I will reach out to the insurance company on Monday.    Tiffanie

## 2022-03-07 NOTE — TELEPHONE ENCOUNTER
Call Back # [phone]  Fax# [phone]  [facility] on 3/7/21@10:20 am  Date of Service: 11/24/21  Claim# [id number]  Total Billed Amount: $495.39    Spoke with Delilah OLMEDO- Requested to fax them Clinical for re-consideration for the denied claim.  Reference #: [id number]  It takes 30 days Max and after receiving the Clinical 10-14 days to re-process the claim.

## 2022-03-08 ENCOUNTER — TELEPHONE (OUTPATIENT)
Dept: GASTROENTEROLOGY | Facility: CLINIC | Age: 67
End: 2022-03-08

## 2022-03-08 NOTE — TELEPHONE ENCOUNTER
03/08/22  Screened by: Alejandrina Lyons    Referring Provider Rickey Aragon    Pre- Screening: There is no height or weight on file to calculate BMI  Has patient been referred for a routine screening Colonoscopy? yes  Is the patient between 39-70 years old? yes      Previous Colonoscopy yes   If yes:    Date: 2017    Facility: [facility]    Reason: fhx polyps, hx polyps      SCHEDULING STAFF: If the patient is between 39yrs-47yrs, please advise patient to confirm benefits/coverage with their insurance company for a routine screening colonoscopy, some insurance carriers will only cover at [address] or older  If the patient is over 66years old, please schedule an office visit  Does the patient want to see a Gastroenterologist prior to their procedure OR are they having any GI symptoms? no    Has the patient been hospitalized or had abdominal surgery in the past 6 months? no    Does the patient use supplemental oxygen? no    Does the patient take Coumadin, Lovenox, Plavix, Elliquis, Xarelto, or other blood thinning medication? no    Has the patient had a stroke, cardiac event, or stent placed in the past year? no    SCHEDULING STAFF: If patient answers NO to above questions, then schedule procedure  If patient answers YES to above questions, then schedule office appointment  If patient is between 45yrs - 49yrs, please advise patient that we will have to confirm benefits & coverage with their insurance company for a routine screening colonoscopy

## 2022-05-10 ENCOUNTER — TELEPHONE (OUTPATIENT)
Dept: GASTROENTEROLOGY | Facility: CLINIC | Age: 67
End: 2022-05-10

## 2022-06-01 LAB
BASOPHILS # BLD AUTO: 0 X10E3/UL (ref 0–0.2)
BASOPHILS NFR BLD AUTO: 1 %
EOSINOPHIL # BLD AUTO: 0.2 X10E3/UL (ref 0–0.4)
EOSINOPHIL NFR BLD AUTO: 4 %
ERYTHROCYTE [DISTWIDTH] IN BLOOD BY AUTOMATED COUNT: 12.4 % (ref 11.7–15.4)
HBA1C MFR BLD: 5.5 % (ref 4.8–5.6)
HCT VFR BLD AUTO: 41 % (ref 34–46.6)
HGB BLD-MCNC: 13.4 G/DL (ref 11.1–15.9)
IMM GRANULOCYTES # BLD AUTO: 0 X10E3/UL (ref 0–0.1)
IMM GRANULOCYTES NFR BLD AUTO: 0 %
LYMPHOCYTES # BLD AUTO: 1.6 X10E3/UL (ref 0.7–3.1)
LYMPHOCYTES NFR BLD AUTO: 35 %
MCH RBC QN AUTO: 32.1 PG (ref 26.6–33)
MCHC RBC AUTO-ENTMCNC: 32.7 G/DL (ref 31.5–35.7)
MCV RBC AUTO: 98 FL (ref 79–97)
MONOCYTES # BLD AUTO: 0.4 X10E3/UL (ref 0.1–0.9)
MONOCYTES NFR BLD AUTO: 10 %
NEUTROPHILS # BLD AUTO: 2.3 X10E3/UL (ref 1.4–7)
NEUTROPHILS NFR BLD AUTO: 50 %
PLATELET # BLD AUTO: 251 X10E3/UL (ref 150–450)
RBC # BLD AUTO: 4.17 X10E6/UL (ref 3.77–5.28)
SPECIMEN STATUS: NORMAL
WBC # BLD AUTO: 4.6 X10E3/UL (ref 3.4–10.8)

## 2022-06-02 LAB
ALBUMIN SERPL-MCNC: 4.3 G/DL (ref 3.8–4.8)
ALBUMIN/GLOB SERPL: 2 {RATIO} (ref 1.2–2.2)
ALP SERPL-CCNC: 64 IU/L (ref 44–121)
ALT SERPL-CCNC: 17 IU/L (ref 0–32)
AST SERPL-CCNC: 22 IU/L (ref 0–40)
BILIRUB SERPL-MCNC: 0.4 MG/DL (ref 0–1.2)
BUN SERPL-MCNC: 12 MG/DL (ref 8–27)
BUN/CREAT SERPL: 14 (ref 12–28)
CALCIUM SERPL-MCNC: 9.7 MG/DL (ref 8.7–10.3)
CHLORIDE SERPL-SCNC: 102 MMOL/L (ref 96–106)
CHOLEST SERPL-MCNC: 171 MG/DL (ref 100–199)
CO2 SERPL-SCNC: 26 MMOL/L (ref 20–29)
CREAT SERPL-MCNC: 0.83 MG/DL (ref 0.57–1)
EGFRCR SERPLBLD CKD-EPI 2021: 78 ML/MIN/1.73
GLOBULIN SER CALC-MCNC: 2.2 G/DL (ref 1.5–4.5)
GLUCOSE SERPL-MCNC: 93 MG/DL (ref 65–99)
HDLC SERPL-MCNC: 51 MG/DL
LDLC SERPL CALC-MCNC: 88 MG/DL (ref 0–99)
POTASSIUM SERPL-SCNC: 3.9 MMOL/L (ref 3.5–5.2)
PROT SERPL-MCNC: 6.5 G/DL (ref 6–8.5)
SODIUM SERPL-SCNC: 142 MMOL/L (ref 134–144)
TRIGL SERPL-MCNC: 187 MG/DL (ref 0–149)
VLDLC SERPL CALC-MCNC: 32 MG/DL (ref 5–40)

## 2022-06-06 NOTE — PROGRESS NOTES
Cardiology Consult/New Patient    Dayna Mcclellan MD          Geraldine Wolff is a 66 y.o. female identifies as who presents with     She returns for cardiac follow-up review her lab work   I increased her rosuvastatin at her last visit  She is followed for hypertension hyperlipidemia  No known obstructive CAD with a r coronary calcium score of 0 in 2019   but there was some mild plaque noted on her thoracic aorta      Lab work June 2022  CBC normal  Cholesterol 170 triglycerides 187 HDL 51 LDL 88  Hemoglobin A1c 5.5  Creatinine 0.83 potassium 3.9               Patient Active Problem List    Diagnosis Date Noted   • PVD (peripheral vascular disease) (CMS/[facility]) 11/30/2021   • Essential hypertension 06/04/2021   • Mixed hyperlipidemia 12/16/2019   • Asthma 12/16/2019   • Family history of premature CAD 12/16/2019   • Abnormal EKG 12/16/2019   • Localized edema 12/16/2019       Medical History:   Past Medical History:   Diagnosis Date   • GERD (gastroesophageal reflux disease)        Surgical History:   Past Surgical History:   Procedure Laterality Date   • CHOLECYSTECTOMY     • FRACTURE SURGERY         Allergies: Hydrocodone-acetaminophen    Current Outpatient Medications   Medication Sig Dispense Refill   • albuterol HFA (VENTOLIN HFA) 90 mcg/actuation inhaler Inhale 2 puffs as needed for wheezing.     • calcium carbonate/vitamin D3 (CALCIUM 600 + D,3, ORAL) Take by mouth daily.     • cholecalciferol, vitamin D3, 5,000 unit (125 mcg) capsule Take 5,000 Units by mouth daily.     • coenzyme Q10 (COQ10) 100 mg capsule Take 100 mg by mouth daily.     • fluticasone propionate (FLONASE) 50 mcg/actuation nasal spray Administer 1 spray into each nostril daily.     • hydrochlorothiazide (HYDRODIURIL) 25 mg tablet Take 25 mg by mouth daily.       • meloxicam (MOBIC) 15 mg tablet Take 15 mg by mouth daily.     • montelukast (SINGULAIR) 10 mg tablet Take by mouth nightly.     • mv-min/iron/folic/calcium/vitK (WOMEN'S  MULTIVITAMIN ORAL) Take by mouth daily.     • omeprazole (PriLOSEC) 20 mg capsule Take 20 mg by mouth See admin instr. Patient takes one tablet 4 times a week      • rosuvastatin (CRESTOR) 20 mg tablet Take 1 tablet (20 mg total) by mouth See admin instr. 40n mon wed fri i20 the rest 120 tablet 6     No current facility-administered medications for this visit.       Social History:   Social History     Socioeconomic History   • Marital status:      Spouse name: None   • Number of children: None   • Years of education: None   • Highest education level: None   Tobacco Use   • Smoking status: Never Smoker   • Smokeless tobacco: Never Used   Substance and Sexual Activity   • Alcohol use: Yes     Alcohol/week: 7.0 standard drinks     Types: 7 Glasses of wine per week   • Drug use: Defer   • Sexual activity: Defer       Family History:   Family History   Problem Relation Age of Onset   • Hypertension Biological Mother    • Heart attack Biological Brother    • Heart disease Biological Brother    • Colon cancer Nephew        Review of Systems   Review of Systems       Objective       Vitals:    06/13/22 1036   BP: 114/84   Pulse: 75   Resp: 18   SpO2: 99%       Physical Exam  HENT:      Head:      Comments: Prominent thyroid         Labs   Lab Results   Component Value Date    WBC 4.6 06/01/2022    HGB 13.4 06/01/2022    HCT 41.0 06/01/2022     06/01/2022    CHOL 171 06/01/2022    TRIG 187 (H) 06/01/2022    HDL 51 06/01/2022    ALT 17 06/01/2022    AST 22 06/01/2022     06/01/2022    K 3.9 06/01/2022     06/01/2022    CREATININE 0.83 06/01/2022    BUN 12 06/01/2022    CO2 26 06/01/2022    TSH 1.65 12/14/2021    HGBA1C 5.5 06/01/2022   LDL66    Imaging      STRESS  Stress echo neg 1/20    CAT  COR VENKATESH SCORE ZERO 12/19 mild plaque thoracic aorta EKG 1/2020 [id number] dec2021 June 2021            Assessment/Plan     Family history of premature CAD  She herself had a coronary calcium score of 0 in  2020 she did have some plaque on her thoracic aorta    Mixed hyperlipidemia  On statin therapy cholesterol not perfect increase slightly to 40 mg Monday Wednesday Friday 20 the rest LDL cholesterol was 90    Essential hypertension  Controlled on hydrochlorothiazide          She is followed for hypertension you have recently put on hydrochlorothiazide she has minimal plaque in her thoracic aorta no known obstructive CAD  I increased her rosuvastatin slightly to 40 mg Monday Wednesday Friday 20 the rest  Went over dietary changes for mild hypertriglyceridemia  She will have lab work with you in 6 months I will see her back in 1 year with lab work  We will repeat coronary calcium score 2024        This letter was generated using speech recognition software.  Please excuse any typographical errors.  Payam Sargent MD  6/13/2022

## 2022-06-13 ENCOUNTER — OFFICE VISIT (OUTPATIENT)
Dept: CARDIOLOGY | Facility: CLINIC | Age: 67
End: 2022-06-13
Payer: MEDICARE

## 2022-06-13 VITALS
SYSTOLIC BLOOD PRESSURE: 114 MMHG | DIASTOLIC BLOOD PRESSURE: 84 MMHG | HEART RATE: 75 BPM | OXYGEN SATURATION: 99 % | HEIGHT: 66 IN | WEIGHT: 181 LBS | BODY MASS INDEX: 29.09 KG/M2 | RESPIRATION RATE: 18 BRPM

## 2022-06-13 DIAGNOSIS — I73.9 PVD (PERIPHERAL VASCULAR DISEASE) (CMS/HCC): ICD-10-CM

## 2022-06-13 DIAGNOSIS — R94.31 ABNORMAL EKG: Primary | ICD-10-CM

## 2022-06-13 DIAGNOSIS — E78.2 MIXED HYPERLIPIDEMIA: ICD-10-CM

## 2022-06-13 PROCEDURE — 99214 OFFICE O/P EST MOD 30 MIN: CPT | Mod: 25 | Performed by: INTERNAL MEDICINE

## 2022-06-13 PROCEDURE — 200200 PR NO CHARGE: Performed by: INTERNAL MEDICINE

## 2022-06-13 PROCEDURE — 93000 ELECTROCARDIOGRAM COMPLETE: CPT | Performed by: INTERNAL MEDICINE

## 2022-06-13 RX ORDER — ROSUVASTATIN CALCIUM 20 MG/1
20 TABLET, COATED ORAL SEE ADMIN INSTRUCTIONS
Qty: 120 TABLET | Refills: 6 | Status: SHIPPED | OUTPATIENT
Start: 2022-06-13 | End: 2022-06-13 | Stop reason: SDUPTHER

## 2022-06-13 RX ORDER — ROSUVASTATIN CALCIUM 40 MG/1
TABLET, COATED ORAL
Qty: 60 TABLET | Refills: 1 | Status: SHIPPED | OUTPATIENT
Start: 2022-06-13 | End: 2023-01-10

## 2022-06-13 NOTE — ASSESSMENT & PLAN NOTE
On statin therapy cholesterol not perfect increase slightly to 40 mg Monday Wednesday Friday 20 the rest LDL cholesterol was 90

## 2022-06-13 NOTE — TELEPHONE ENCOUNTER
FYI---Pt of Dr Arie Ochoauvastatin adjusted for insurance coverage    Insurance unwilling to cover for 20 mg tabs, only 40 mg tabs

## 2022-06-13 NOTE — ASSESSMENT & PLAN NOTE
She herself had a coronary calcium score of 0 in 2020 she did have some plaque on her thoracic aorta

## 2022-07-07 ENCOUNTER — TELEPHONE (OUTPATIENT)
Dept: GASTROENTEROLOGY | Facility: CLINIC | Age: 67
End: 2022-07-07

## 2022-07-07 DIAGNOSIS — Z12.11 SCREENING FOR COLON CANCER: Primary | ICD-10-CM

## 2022-07-11 NOTE — TELEPHONE ENCOUNTER
Scheduled date of colonoscopy (as of today):7/19/22  Physician performing colonoscopy:Dr Jose Maria Harry  Location of colonoscopy:[facility]  Bowel prep reviewed with patient:Colyte  Instructions reviewed with patient by:PD  Clearances: none

## 2022-07-12 NOTE — TELEPHONE ENCOUNTER
Pt calling because someone was supposed to send the colonoscopy instructions via email but she hasn't received them  Reviewed chart  Golytely instructions sent by this RN to [email or web address]  net as confirmed by pt

## 2022-07-19 ENCOUNTER — HOSPITAL ENCOUNTER (OUTPATIENT)
Dept: GASTROENTEROLOGY | Facility: AMBULATORY SURGERY CENTER | Age: 67
Discharge: HOME | End: 2022-07-19
Payer: MEDICARE

## 2022-07-19 ENCOUNTER — ANESTHESIA EVENT (OUTPATIENT)
Dept: GASTROENTEROLOGY | Facility: AMBULATORY SURGERY CENTER | Age: 67
End: 2022-07-19

## 2022-07-19 ENCOUNTER — ANESTHESIA (OUTPATIENT)
Dept: GASTROENTEROLOGY | Facility: AMBULATORY SURGERY CENTER | Age: 67
End: 2022-07-19

## 2022-07-19 VITALS
SYSTOLIC BLOOD PRESSURE: 127 MMHG | HEART RATE: 72 BPM | OXYGEN SATURATION: 97 % | TEMPERATURE: 97.9 F | WEIGHT: 178 LBS | BODY MASS INDEX: 28.61 KG/M2 | HEIGHT: 66 IN | RESPIRATION RATE: 24 BRPM | DIASTOLIC BLOOD PRESSURE: 64 MMHG

## 2022-07-19 DIAGNOSIS — Z86.010 HISTORY OF COLON POLYPS: ICD-10-CM

## 2022-07-19 PROCEDURE — 88305 TISSUE EXAM BY PATHOLOGIST: CPT | Performed by: PATHOLOGY

## 2022-07-19 PROCEDURE — 45380 COLONOSCOPY AND BIOPSY: CPT | Performed by: INTERNAL MEDICINE

## 2022-07-19 RX ORDER — SODIUM CHLORIDE, SODIUM LACTATE, POTASSIUM CHLORIDE, CALCIUM CHLORIDE 600; 310; 30; 20 MG/100ML; MG/100ML; MG/100ML; MG/100ML
50 INJECTION, SOLUTION INTRAVENOUS CONTINUOUS
Status: DISCONTINUED | OUTPATIENT
Start: 2022-07-19 | End: 2022-07-23 | Stop reason: HOSPADM

## 2022-07-19 RX ORDER — SODIUM CHLORIDE, SODIUM LACTATE, POTASSIUM CHLORIDE, CALCIUM CHLORIDE 600; 310; 30; 20 MG/100ML; MG/100ML; MG/100ML; MG/100ML
INJECTION, SOLUTION INTRAVENOUS CONTINUOUS PRN
Status: DISCONTINUED | OUTPATIENT
Start: 2022-07-19 | End: 2022-07-19

## 2022-07-19 RX ORDER — PROPOFOL 10 MG/ML
INJECTION, EMULSION INTRAVENOUS AS NEEDED
Status: DISCONTINUED | OUTPATIENT
Start: 2022-07-19 | End: 2022-07-19

## 2022-07-19 RX ADMIN — PROPOFOL 50 MG: 10 INJECTION, EMULSION INTRAVENOUS at 09:03

## 2022-07-19 RX ADMIN — PROPOFOL 100 MG: 10 INJECTION, EMULSION INTRAVENOUS at 08:50

## 2022-07-19 RX ADMIN — PROPOFOL 50 MG: 10 INJECTION, EMULSION INTRAVENOUS at 09:08

## 2022-07-19 RX ADMIN — SODIUM CHLORIDE, SODIUM LACTATE, POTASSIUM CHLORIDE, CALCIUM CHLORIDE 50 ML/HR: 600; 310; 30; 20 INJECTION, SOLUTION INTRAVENOUS at 08:28

## 2022-07-19 RX ADMIN — SODIUM CHLORIDE, SODIUM LACTATE, POTASSIUM CHLORIDE, CALCIUM CHLORIDE: 600; 310; 30; 20 INJECTION, SOLUTION INTRAVENOUS at 08:38

## 2022-07-19 RX ADMIN — PROPOFOL 50 MG: 10 INJECTION, EMULSION INTRAVENOUS at 08:53

## 2022-07-19 RX ADMIN — PROPOFOL 50 MG: 10 INJECTION, EMULSION INTRAVENOUS at 08:57

## 2022-07-19 NOTE — ANESTHESIA POSTPROCEDURE EVALUATION
Post-Op Assessment Note    CV Status:  Stable  Pain Score: 0    Pain management: adequate     Mental Status:  Arousable and sleepy   Hydration Status:  Stable   PONV Controlled:  Controlled   Airway Patency:  Patent      Post Op Vitals Reviewed: Yes      Staff: CRNA         No complications documented      BP   116/63   Temp 97 6   Pulse 77   Resp 14   SpO2 99

## 2022-07-19 NOTE — ANESTHESIA PREPROCEDURE EVALUATION
Procedure:  COLONOSCOPY    Relevant Problems   GI/HEPATIC   (+) GERD (gastroesophageal reflux disease)      PULMONARY   (+) Asthma        Physical Exam    Airway    Mallampati score: II  TM Distance: >3 FB  Neck ROM: full     Dental   No notable dental hx     Cardiovascular      Pulmonary      Other Findings        Anesthesia Plan  ASA Score- 2     Anesthesia Type- IV sedation with anesthesia with ASA Monitors  Additional Monitors:   Airway Plan:           Plan Factors-Exercise tolerance (METS): >4 METS  Chart reviewed  Patient summary reviewed  Patient is not a current smoker  Induction- intravenous  Postoperative Plan-     Informed Consent- Anesthetic plan and risks discussed with patient  I personally reviewed this patient with the CRNA  Discussed and agreed on the Anesthesia Plan with the JULIET Rosa

## 2022-07-19 NOTE — H&P
History and Physical -  [facility]  Sandra Cantu 73 y o  female MRN: [id number]    HPI: Sandra Cantu is a 73y o  year old female who presents for surveillance colonoscopy  She has a family history of colon cancer father in his mid 62s a personal history of colon polyps    REVIEW OF SYSTEMS: Per the HPI, and otherwise unremarkable      Historical Information   Past Medical History:   Diagnosis Date    Asthma     Colon polyp     GERD (gastroesophageal reflux disease)     Hyperlipidemia     Hypertension      Past Surgical History:   Procedure Laterality Date    BREAST BIOPSY Right 2001    benign    CHOLECYSTECTOMY      COLONOSCOPY       Social History   Social History     Substance and Sexual Activity   Alcohol Use None     Social History     Substance and Sexual Activity   Drug Use Not on file     Social History     Tobacco Use   Smoking Status Current Every Day Smoker   Smokeless Tobacco Never Used     Family History   Problem Relation Age of Onset    Colon cancer Father 59        metastatic    Breast cancer Sister 46    No Known Problems Mother     No Known Problems Daughter     No Known Problems Maternal Grandmother     No Known Problems Maternal Grandfather     No Known Problems Paternal Grandmother     No Known Problems Paternal Grandfather     No Known Problems Daughter     Breast cancer Other 39    No Known Problems Sister     Colon cancer Other 52       Meds/Allergies       Current Outpatient Medications:     Calcium 500 MG tablet    glucosamine 500 MG CAPS capsule    meloxicam (MOBIC) 15 mg tablet    montelukast (SINGULAIR) 10 mg tablet    omeprazole (PriLOSEC) 20 mg delayed release capsule    polyethylene glycol (GOLYTELY) 4000 mL solution    VITAMIN B COMPLEX-C CAPS    Current Facility-Administered Medications:     lactated ringers infusion, 50 mL/hr, Intravenous, Continuous, 50 mL/hr at 07/19/22 7913    Facility-Administered Medications Ordered in Other Encounters:     lactated ringers infusion, , Intravenous, Continuous PRN, New Bag at 07/19/22 3661    Allergies   Allergen Reactions    Hydrocodone-Acetaminophen Confusion     Reaction Date: 12Apr2012;        Objective     /67   Pulse 80   Temp 97 9 °F (36 6 °C) (Temporal)   Resp 18   Ht 5' 6" (1 676 m)   Wt 80 7 kg (178 lb)   SpO2 97%   BMI 28 73 kg/m²     PHYSICAL EXAM    Gen: NAD AAOx3  Head: Normocephalic, Atraumatic  CV: S1S2 RRR no m/r/g  CHEST: Clear b/l no c/r/w  ABD: soft, +BS NT/ND  EXT: no edema    ASSESSMENT/PLAN:  This is a 73y o  year old female here for surveillance colonoscopy, and she is stable and optimized for her procedure

## 2023-02-02 ENCOUNTER — HOSPITAL ENCOUNTER (OUTPATIENT)
Dept: MAMMOGRAPHY | Facility: CLINIC | Age: 68
Discharge: HOME | End: 2023-02-02

## 2023-02-02 VITALS — BODY MASS INDEX: 28.59 KG/M2 | WEIGHT: 177.91 LBS | HEIGHT: 66 IN

## 2023-02-02 DIAGNOSIS — Z12.31 ENCOUNTER FOR SCREENING MAMMOGRAM FOR MALIGNANT NEOPLASM OF BREAST: ICD-10-CM

## 2023-03-07 ENCOUNTER — TELEPHONE (OUTPATIENT)
Dept: SCHEDULING | Facility: CLINIC | Age: 68
End: 2023-03-07
Payer: COMMERCIAL

## 2023-03-07 RX ORDER — ROSUVASTATIN CALCIUM 40 MG/1
TABLET, COATED ORAL
Qty: 60 TABLET | Refills: 3 | Status: SHIPPED | OUTPATIENT
Start: 2023-03-07 | End: 2023-06-13 | Stop reason: SDUPTHER

## 2023-03-07 NOTE — TELEPHONE ENCOUNTER
Medicine Refill Request    Last Office: Visit date not found   Last Consult Visit: Visit date not found  Last Telemedicine Visit: Visit date not found    Next Appointment: Visit date not found  rosuvastatin (CRESTOR) 20 mg tablet  (not on list)  45 pills    Pt would like this medication to save her having to split a pill in half.    Current Outpatient Medications:   •  albuterol HFA (VENTOLIN HFA) 90 mcg/actuation inhaler, Inhale 2 puffs as needed for wheezing., Disp: , Rfl:   •  calcium carbonate/vitamin D3 (CALCIUM 600 + D,3, ORAL), Take by mouth daily., Disp: , Rfl:   •  cholecalciferol, vitamin D3, 5,000 unit (125 mcg) capsule, Take 5,000 Units by mouth daily., Disp: , Rfl:   •  coenzyme Q10 (COQ10) 100 mg capsule, Take 100 mg by mouth daily., Disp: , Rfl:   •  fluticasone propionate (FLONASE) 50 mcg/actuation nasal spray, Administer 1 spray into each nostril daily., Disp: , Rfl:   •  hydrochlorothiazide (HYDRODIURIL) 25 mg tablet, Take 25 mg by mouth daily.  , Disp: , Rfl:   •  meloxicam (MOBIC) 15 mg tablet, Take 15 mg by mouth daily., Disp: , Rfl:   •  montelukast (SINGULAIR) 10 mg tablet, Take by mouth nightly., Disp: , Rfl:   •  mv-min/iron/folic/calcium/vitK (WOMEN'S MULTIVITAMIN ORAL), Take by mouth daily., Disp: , Rfl:   •  omeprazole (PriLOSEC) 20 mg capsule, Take 20 mg by mouth See admin instr. Patient takes one tablet 4 times a week , Disp: , Rfl:   •  rosuvastatin (CRESTOR) 40 mg tablet, TAKE ONE TABLET BY MOUTH ON monday, wednesday AND friday alternating with 1/2 tablet ON tuesday, thursday, saturday AND sunday, Disp: 60 tablet, Rfl: 3      BP Readings from Last 3 Encounters:   06/13/22 114/84   12/10/21 112/82   06/04/21 124/78       Recent Lab results:  Lab Results   Component Value Date    CHOL 171 06/01/2022   ,   Lab Results   Component Value Date    HDL 51 06/01/2022   ,   Lab Results   Component Value Date    LDLCALC 88 06/01/2022   ,   Lab Results   Component Value Date    TRIG 187 (H)  06/01/2022        Lab Results   Component Value Date    GLUCOSE 93 06/01/2022   ,   Lab Results   Component Value Date    HGBA1C 5.5 06/01/2022         Lab Results   Component Value Date    CREATININE 0.83 06/01/2022       Lab Results   Component Value Date    TSH 1.65 12/14/2021

## 2023-05-18 DIAGNOSIS — E78.2 MIXED HYPERLIPIDEMIA: Primary | ICD-10-CM

## 2023-05-23 LAB
ALBUMIN SERPL-MCNC: 4.2 G/DL (ref 3.6–5.1)
ALBUMIN/GLOB SERPL: 1.8 (CALC) (ref 1–2.5)
ALP SERPL-CCNC: 55 U/L (ref 37–153)
ALT SERPL-CCNC: 15 U/L (ref 6–29)
AST SERPL-CCNC: 19 U/L (ref 10–35)
BASOPHILS # BLD AUTO: 32 CELLS/UL (ref 0–200)
BASOPHILS NFR BLD AUTO: 0.7 %
BILIRUB SERPL-MCNC: 0.7 MG/DL (ref 0.2–1.2)
BUN SERPL-MCNC: 15 MG/DL (ref 7–25)
BUN/CREAT SERPL: ABNORMAL (CALC) (ref 6–22)
CALCIUM SERPL-MCNC: 9.8 MG/DL (ref 8.6–10.4)
CHLORIDE SERPL-SCNC: 105 MMOL/L (ref 98–110)
CHOLEST SERPL-MCNC: 160 MG/DL
CHOLEST/HDLC SERPL: 2.4 (CALC)
CO2 SERPL-SCNC: 34 MMOL/L (ref 20–32)
CREAT SERPL-MCNC: 0.76 MG/DL (ref 0.5–1.05)
EGFRCR SERPLBLD CKD-EPI 2021: 86 ML/MIN/1.73M2
EOSINOPHIL # BLD AUTO: 239 CELLS/UL (ref 15–500)
EOSINOPHIL NFR BLD AUTO: 5.3 %
ERYTHROCYTE [DISTWIDTH] IN BLOOD BY AUTOMATED COUNT: 12.3 % (ref 11–15)
GLOBULIN SER CALC-MCNC: 2.3 G/DL (CALC) (ref 1.9–3.7)
GLUCOSE SERPL-MCNC: 89 MG/DL (ref 65–99)
HCT VFR BLD AUTO: 41.8 % (ref 35–45)
HDLC SERPL-MCNC: 68 MG/DL
HGB BLD-MCNC: 13.8 G/DL (ref 11.7–15.5)
LDLC SERPL CALC-MCNC: 71 MG/DL (CALC)
LYMPHOCYTES # BLD AUTO: 1620 CELLS/UL (ref 850–3900)
LYMPHOCYTES NFR BLD AUTO: 36 %
MCH RBC QN AUTO: 32.7 PG (ref 27–33)
MCHC RBC AUTO-ENTMCNC: 33 G/DL (ref 32–36)
MCV RBC AUTO: 99.1 FL (ref 80–100)
MONOCYTES # BLD AUTO: 351 CELLS/UL (ref 200–950)
MONOCYTES NFR BLD AUTO: 7.8 %
NEUTROPHILS # BLD AUTO: 2259 CELLS/UL (ref 1500–7800)
NEUTROPHILS NFR BLD AUTO: 50.2 %
NONHDLC SERPL-MCNC: 92 MG/DL (CALC)
PLATELET # BLD AUTO: 214 THOUSAND/UL (ref 140–400)
PMV BLD REES-ECKER: 9.1 FL (ref 7.5–12.5)
POTASSIUM SERPL-SCNC: 3.9 MMOL/L (ref 3.5–5.3)
PROT SERPL-MCNC: 6.5 G/DL (ref 6.1–8.1)
RBC # BLD AUTO: 4.22 MILLION/UL (ref 3.8–5.1)
SODIUM SERPL-SCNC: 144 MMOL/L (ref 135–146)
TRIGL SERPL-MCNC: 133 MG/DL
WBC # BLD AUTO: 4.5 THOUSAND/UL (ref 3.8–10.8)

## 2023-06-06 NOTE — PROGRESS NOTES
Cardiology Consult/New Patient    Dayna Mcclellan MD          Geraldine Wolff is a 67 y.o. female identifies as who presents with     She returns for follow-up and review her lab work  She has a family history of premature cardiac disease   she had a coronary calcium score of 0 in 2020   there was some plaque noted on the thoracic aorta   she is on statin therapy  She is treated for hypertension  TG now at goal with increasing crestor dose    Lab work May 2023  Cholesterol LDL 70 triglycerides 133 improved from 190 creatinine 0.76 potassium 3.9  CBC normal                   Patient Active Problem List    Diagnosis Date Noted    PVD (peripheral vascular disease) (CMS/[facility]) 11/30/2021    Essential hypertension 06/04/2021    Mixed hyperlipidemia 12/16/2019    Asthma 12/16/2019    Family history of premature CAD 12/16/2019    Abnormal EKG 12/16/2019    Localized edema 12/16/2019       Medical History:   Past Medical History:   Diagnosis Date    GERD (gastroesophageal reflux disease)        Surgical History:   Past Surgical History:   Procedure Laterality Date    CHOLECYSTECTOMY      FRACTURE SURGERY         Allergies: Hydrocodone-acetaminophen    Current Outpatient Medications   Medication Sig Dispense Refill    albuterol HFA (VENTOLIN HFA) 90 mcg/actuation inhaler Inhale 2 puffs as needed for wheezing.      calcium carbonate/vitamin D3 (CALCIUM 600 + D,3, ORAL) Take by mouth daily.      cholecalciferol, vitamin D3, 5,000 unit (125 mcg) capsule Take 5,000 Units by mouth daily.      coenzyme Q10 (COQ10) 100 mg capsule Take 100 mg by mouth daily.      fluticasone propionate (FLONASE) 50 mcg/actuation nasal spray Administer 1 spray into each nostril daily.      hydrochlorothiazide (HYDRODIURIL) 25 mg tablet Take 25 mg by mouth daily.        meloxicam (MOBIC) 15 mg tablet Take 15 mg by mouth daily.      montelukast (SINGULAIR) 10 mg tablet Take by mouth nightly.      mv-min/iron/folic/calcium/vitK (WOMEN'S MULTIVITAMIN  ORAL) Take by mouth daily.      omeprazole (PriLOSEC) 20 mg capsule Take 20 mg by mouth See admin instr. Patient takes one tablet 3 times a week      rosuvastatin (CRESTOR) 40 mg tablet TAKE ONE TABLET BY MOUTH ON monday, wednesday AND friday alternating with 1/2 tablet ON tuesday, thursday, saturday AND sunday 60 tablet 3     No current facility-administered medications for this visit.       Social History:   Social History     Socioeconomic History    Marital status:      Spouse name: None    Number of children: None    Years of education: None    Highest education level: None   Tobacco Use    Smoking status: Never    Smokeless tobacco: Never   Substance and Sexual Activity    Alcohol use: Yes     Alcohol/week: 7.0 standard drinks of alcohol     Types: 7 Glasses of wine per week    Drug use: Defer    Sexual activity: Defer       Family History:   Family History   Problem Relation Age of Onset    Hypertension Biological Mother     Heart attack Biological Brother     Heart disease Biological Brother     Colon cancer Nephew        Review of Systems   ROS    Objective       Vitals:    06/13/23 1035   BP: 130/78   Pulse: 76   Resp: 18   SpO2: 98%       Physical Exam  HENT:      Head:      Comments: Prominent thyroid         Labs   Lab Results   Component Value Date    WBC 4.5 05/22/2023    HGB 13.8 05/22/2023    HCT 41.8 05/22/2023     05/22/2023    CHOL 160 05/22/2023    TRIG 133 05/22/2023    HDL 68 05/22/2023    ALT 15 05/22/2023    AST 19 05/22/2023     05/22/2023    K 3.9 05/22/2023     05/22/2023    CREATININE 0.76 05/22/2023    BUN 15 05/22/2023    CO2 34 (H) 05/22/2023    TSH 1.65 12/14/2021    HGBA1C 5.5 06/01/2022   LDL66    Imaging      STRESS  Stress echo neg 1/20    CAT  COR ANA SCORE ZERO 12/19 mild plaque thoracic aorta EKG 1/2020      Cor ana score zero June 2024                                        Assessment/Plan     Family history of premature CAD  Brother had bypass  surgery she had coronary calcium score of 0 in 0 2019, repeat prior to next visit    Mixed hyperlipidemia  Triglycerides came down with increasing rosuvastatin her LDL is at goal she is on rosuvastatin 40 alternating with 20 i triglycerides 133 down from 200 LDL cholesterol 70    Essential hypertension  Controlled with hydrochlorothiazide          She is followed for hypertension you have recently put on hydrochlorothiazide she has minimal plaque in her thoracic aorta no known obstructive CAD  With coronary calcium score of 0 in 2019 we will repeat next year  I increased her rosuvastatin slightly to 40 mg Monday Wednesday Friday 20 the rest  Went over dietary changes for mild hypertriglyceridemia  And her triglycerides are now at goal!!  I made no changes in medication today follow-up in 1 year with lab work and repeat coronary calcium score          This letter was generated using speech recognition software.  Please excuse any typographical errors.  Payam Sargent MD  6/13/2023

## 2023-06-13 ENCOUNTER — OFFICE VISIT (OUTPATIENT)
Dept: CARDIOLOGY | Facility: CLINIC | Age: 68
End: 2023-06-13
Payer: MEDICARE

## 2023-06-13 VITALS
HEIGHT: 66 IN | RESPIRATION RATE: 18 BRPM | WEIGHT: 182 LBS | SYSTOLIC BLOOD PRESSURE: 130 MMHG | DIASTOLIC BLOOD PRESSURE: 78 MMHG | BODY MASS INDEX: 29.25 KG/M2 | HEART RATE: 76 BPM | OXYGEN SATURATION: 98 %

## 2023-06-13 DIAGNOSIS — E78.2 MIXED HYPERLIPIDEMIA: ICD-10-CM

## 2023-06-13 DIAGNOSIS — I73.9 PVD (PERIPHERAL VASCULAR DISEASE) (CMS/HCC): Primary | ICD-10-CM

## 2023-06-13 DIAGNOSIS — Z82.49 FAMILY HISTORY OF PREMATURE CAD: ICD-10-CM

## 2023-06-13 DIAGNOSIS — R94.31 ABNORMAL EKG: ICD-10-CM

## 2023-06-13 DIAGNOSIS — I10 ESSENTIAL HYPERTENSION: ICD-10-CM

## 2023-06-13 PROCEDURE — 99213 OFFICE O/P EST LOW 20 MIN: CPT | Performed by: INTERNAL MEDICINE

## 2023-06-13 PROCEDURE — 93000 ELECTROCARDIOGRAM COMPLETE: CPT | Performed by: INTERNAL MEDICINE

## 2023-06-13 RX ORDER — ROSUVASTATIN CALCIUM 40 MG/1
TABLET, COATED ORAL
Qty: 60 TABLET | Refills: 3 | Status: SHIPPED | OUTPATIENT
Start: 2023-06-13 | End: 2023-10-02 | Stop reason: SDUPTHER

## 2023-06-13 RX ORDER — ROSUVASTATIN CALCIUM 40 MG/1
TABLET, COATED ORAL
Qty: 60 TABLET | Refills: 3 | Status: SHIPPED | OUTPATIENT
Start: 2023-06-13 | End: 2023-06-13 | Stop reason: SDUPTHER

## 2023-06-13 NOTE — ASSESSMENT & PLAN NOTE
Triglycerides came down with increasing rosuvastatin her LDL is at goal she is on rosuvastatin 40 alternating with 20 i triglycerides 133 down from 200 LDL cholesterol 70

## 2023-06-13 NOTE — ASSESSMENT & PLAN NOTE
Brother had bypass surgery she had coronary calcium score of 0 in 0 2019, repeat prior to next visit

## 2023-10-02 DIAGNOSIS — E78.2 MIXED HYPERLIPIDEMIA: Primary | ICD-10-CM

## 2023-10-02 RX ORDER — ROSUVASTATIN CALCIUM 40 MG/1
TABLET, COATED ORAL
Qty: 60 TABLET | Refills: 3 | Status: SHIPPED | OUTPATIENT
Start: 2023-10-02 | End: 2024-06-13

## 2024-01-02 ENCOUNTER — TELEPHONE (OUTPATIENT)
Dept: CARDIOLOGY | Facility: CLINIC | Age: 69
End: 2024-01-02
Payer: COMMERCIAL

## 2024-01-02 NOTE — TELEPHONE ENCOUNTER
----- Message from Geraldine Wolff sent at 12/30/2023 12:30 PM EST -----  Regarding: Rosuvastatin   Contact: [phone]  Hello,   I wanted to let Dr Sargent know I stopped taking Rosuvastatin to see if I would feel fewer body aches. I feel much better. No more feeling like an “80 year old”. I felt like I was hunching over and at 68 years old I don’t want to feel like that.     I will keep my [place] appointment and discuss this with Dr Sargent, but I told my PCP I would tell you this information at this time.     Sincerely,  Jacey Wolff

## 2024-01-09 ENCOUNTER — TELEPHONE (OUTPATIENT)
Dept: CARDIOLOGY | Facility: CLINIC | Age: 69
End: 2024-01-09
Payer: COMMERCIAL

## 2024-01-09 RX ORDER — PRAVASTATIN SODIUM 80 MG/1
80 TABLET ORAL DAILY
Qty: 90 TABLET | Refills: 1 | Status: SHIPPED | OUTPATIENT
Start: 2024-01-09 | End: 2024-06-13 | Stop reason: SDUPTHER

## 2024-01-09 NOTE — TELEPHONE ENCOUNTER
----- Message from Gerladine Wolff sent at 1/9/2024  4:16 PM EST -----  Regarding: I need to change to a different statin medication  Contact: [phone]  Good Afternoon,  My PCP had me get bloodwork at [facility] on January 8, 2024 and my cholesterol is very high(289). Triglycerides were 197 and LDL was 194. I stopped taking the Rosuvastatin around September of 2023. I had recently sent an email to you explaining that the Rosuvastatin made my body ache. I felt like an old woman.    I thought I could wait until my June appointment, but this bloodwork shows I can't wait.     is there another statin drug you might recommend?    Thank you,  Jcaey Wolff

## 2024-01-09 NOTE — TELEPHONE ENCOUNTER
You can try Pravachol 80 mg so I will send it into your pharmacy, I suspect she will end up on injectable medications

## 2024-03-19 ENCOUNTER — TRANSCRIBE ORDERS (OUTPATIENT)
Dept: SCHEDULING | Age: 69
End: 2024-03-19

## 2024-04-09 ENCOUNTER — HOSPITAL ENCOUNTER (OUTPATIENT)
Dept: MAMMOGRAPHY | Facility: IMAGING CENTER | Age: 69
Discharge: HOME | End: 2024-04-09
Payer: MEDICARE

## 2024-04-09 ENCOUNTER — HOSPITAL ENCOUNTER (OUTPATIENT)
Dept: BONE DENSITY | Facility: IMAGING CENTER | Age: 69
End: 2024-04-09
Payer: MEDICARE

## 2024-04-09 VITALS — BODY MASS INDEX: 28.45 KG/M2 | HEIGHT: 66 IN | WEIGHT: 177 LBS

## 2024-04-09 DIAGNOSIS — Z12.31 ENCOUNTER FOR SCREENING MAMMOGRAM FOR MALIGNANT NEOPLASM OF BREAST: ICD-10-CM

## 2024-04-09 PROCEDURE — 77063 BREAST TOMOSYNTHESIS BI: CPT

## 2024-04-09 PROCEDURE — 77067 SCR MAMMO BI INCL CAD: CPT

## 2024-04-24 ENCOUNTER — HOSPITAL ENCOUNTER (OUTPATIENT)
Dept: BONE DENSITY | Facility: IMAGING CENTER | Age: 69
Discharge: HOME | End: 2024-04-24
Payer: MEDICARE

## 2024-04-24 VITALS — WEIGHT: 178 LBS | HEIGHT: 66 IN | BODY MASS INDEX: 28.61 KG/M2

## 2024-04-24 DIAGNOSIS — Z78.0 OSTEOPENIA AFTER MENOPAUSE: ICD-10-CM

## 2024-04-24 DIAGNOSIS — Z78.0 ASYMPTOMATIC MENOPAUSAL STATE: ICD-10-CM

## 2024-04-24 DIAGNOSIS — Z13.820 OSTEOPOROSIS SCREENING: ICD-10-CM

## 2024-04-24 DIAGNOSIS — M85.80 OSTEOPENIA AFTER MENOPAUSE: ICD-10-CM

## 2024-04-24 PROCEDURE — 77080 DXA BONE DENSITY AXIAL: CPT

## 2024-05-07 ENCOUNTER — HOSPITAL ENCOUNTER (OUTPATIENT)
Dept: CT IMAGING | Facility: HOSPITAL | Age: 69
Discharge: HOME | End: 2024-05-07
Payer: MEDICARE

## 2024-05-07 DIAGNOSIS — H04.212 EPIPHORA DUE TO EXCESS LACRIMATION OF LEFT SIDE: ICD-10-CM

## 2024-05-07 DIAGNOSIS — R09.81 NASAL CONGESTION: ICD-10-CM

## 2024-05-07 DIAGNOSIS — Z98.890 S/P NASAL SURGERY: ICD-10-CM

## 2024-05-07 DIAGNOSIS — R09.82 PND (POST-NASAL DRIP): ICD-10-CM

## 2024-05-07 PROCEDURE — 70486 CT MAXILLOFACIAL W/O DYE: CPT

## 2024-05-24 LAB
ALBUMIN SERPL-MCNC: 4.3 G/DL (ref 3.9–4.9)
ALBUMIN/GLOB SERPL: 1.9 {RATIO} (ref 1.2–2.2)
ALP SERPL-CCNC: 58 IU/L (ref 44–121)
ALT SERPL-CCNC: 16 IU/L (ref 0–32)
AST SERPL-CCNC: 19 IU/L (ref 0–40)
BASOPHILS # BLD AUTO: 0 X10E3/UL (ref 0–0.2)
BASOPHILS NFR BLD AUTO: 1 %
BILIRUB SERPL-MCNC: 0.5 MG/DL (ref 0–1.2)
BUN SERPL-MCNC: 14 MG/DL (ref 8–27)
BUN/CREAT SERPL: 15 (ref 12–28)
CALCIUM SERPL-MCNC: 9.4 MG/DL (ref 8.7–10.3)
CHLORIDE SERPL-SCNC: 103 MMOL/L (ref 96–106)
CHOLEST SERPL-MCNC: 204 MG/DL (ref 100–199)
CO2 SERPL-SCNC: 28 MMOL/L (ref 20–29)
CREAT SERPL-MCNC: 0.93 MG/DL (ref 0.57–1)
EGFRCR SERPLBLD CKD-EPI 2021: 67 ML/MIN/1.73
EOSINOPHIL # BLD AUTO: 0.2 X10E3/UL (ref 0–0.4)
EOSINOPHIL NFR BLD AUTO: 4 %
ERYTHROCYTE [DISTWIDTH] IN BLOOD BY AUTOMATED COUNT: 12.2 % (ref 11.7–15.4)
GLOBULIN SER CALC-MCNC: 2.3 G/DL (ref 1.5–4.5)
GLUCOSE SERPL-MCNC: 96 MG/DL (ref 70–99)
HCT VFR BLD AUTO: 44.4 % (ref 34–46.6)
HDLC SERPL-MCNC: 64 MG/DL
HGB BLD-MCNC: 14.6 G/DL (ref 11.1–15.9)
IMM GRANULOCYTES # BLD AUTO: 0 X10E3/UL (ref 0–0.1)
IMM GRANULOCYTES NFR BLD AUTO: 0 %
LDLC SERPL CALC-MCNC: 118 MG/DL (ref 0–99)
LYMPHOCYTES # BLD AUTO: 1.4 X10E3/UL (ref 0.7–3.1)
LYMPHOCYTES NFR BLD AUTO: 35 %
MCH RBC QN AUTO: 33.7 PG (ref 26.6–33)
MCHC RBC AUTO-ENTMCNC: 32.9 G/DL (ref 31.5–35.7)
MCV RBC AUTO: 103 FL (ref 79–97)
MONOCYTES # BLD AUTO: 0.3 X10E3/UL (ref 0.1–0.9)
MONOCYTES NFR BLD AUTO: 7 %
NEUTROPHILS # BLD AUTO: 2.1 X10E3/UL (ref 1.4–7)
NEUTROPHILS NFR BLD AUTO: 53 %
PLATELET # BLD AUTO: 229 X10E3/UL (ref 150–450)
POTASSIUM SERPL-SCNC: 4.2 MMOL/L (ref 3.5–5.2)
PROT SERPL-MCNC: 6.6 G/DL (ref 6–8.5)
RBC # BLD AUTO: 4.33 X10E6/UL (ref 3.77–5.28)
SODIUM SERPL-SCNC: 145 MMOL/L (ref 134–144)
TRIGL SERPL-MCNC: 128 MG/DL (ref 0–149)
VLDLC SERPL CALC-MCNC: 22 MG/DL (ref 5–40)
WBC # BLD AUTO: 4 X10E3/UL (ref 3.4–10.8)

## 2024-06-04 ENCOUNTER — HOSPITAL ENCOUNTER (OUTPATIENT)
Dept: RADIOLOGY | Age: 69
Discharge: HOME | End: 2024-06-04
Attending: INTERNAL MEDICINE
Payer: MEDICARE

## 2024-06-04 DIAGNOSIS — Z82.49 FAMILY HISTORY OF PREMATURE CAD: ICD-10-CM

## 2024-06-04 PROCEDURE — 75571 CT HRT W/O DYE W/CA TEST: CPT

## 2024-06-05 NOTE — PROGRESS NOTES
Cardiology Note    Dayna Mcclellan MD      Geraldine Wolff is a 68 y.o. female identifies as who presents with       sHe returns for cardiac follow-up   she has a family history of premature cardiac disease with her brother having bypass surgery   she herself has no known obstructive CAD with coronary calcium score of 0, and 2024 she has documented arteriosclerosis with plaque seen in thoracic aorta in the past  She is treated for hypertension hyperlipidemia she is on pravastatin, intolerant to rosuvastatin      Lab work May 2024  Cholesterol 204 triglycerides 128 HDL 64   Creatinine 0.9 potassium 4.2  CBC normal                         Patient Active Problem List    Diagnosis Date Noted    PVD (peripheral vascular disease) (CMS/[facility]) 11/30/2021    Essential hypertension 06/04/2021    Mixed hyperlipidemia 12/16/2019    Asthma 12/16/2019    Family history of premature CAD 12/16/2019    Abnormal EKG 12/16/2019    Localized edema 12/16/2019     Medical History:   Past Medical History:   Diagnosis Date    GERD (gastroesophageal reflux disease)      Surgical History:   Past Surgical History:   Procedure Laterality Date    CHOLECYSTECTOMY      FRACTURE SURGERY       Allergies: Hydrocodone-acetaminophen and Rosuvastatin    Current Outpatient Medications   Medication Sig Dispense Refill    albuterol HFA (VENTOLIN HFA) 90 mcg/actuation inhaler Inhale 2 puffs as needed for wheezing.      calcium carbonate/vitamin D3 (CALCIUM 600 + D,3, ORAL) Take by mouth daily.      cholecalciferol, vitamin D3, 5,000 unit (125 mcg) capsule Take 5,000 Units by mouth daily.      coenzyme Q10 (COQ10) 100 mg capsule Take 100 mg by mouth daily.      fluticasone propionate (FLONASE) 50 mcg/actuation nasal spray Administer 1 spray into each nostril daily.      hydrochlorothiazide (HYDRODIURIL) 25 mg tablet Take 25 mg by mouth daily.        meloxicam (MOBIC) 15 mg tablet Take 15 mg by mouth daily.      montelukast (SINGULAIR) 10 mg tablet  Take by mouth nightly.      mv-min/iron/folic/calcium/vitK (WOMEN'S MULTIVITAMIN ORAL) Take by mouth daily.      omeprazole (PriLOSEC) 20 mg capsule Take 20 mg by mouth See admin instr. Patient takes one tablet 3 times a week      pravastatin (PRAVACHOL) 80 mg tablet Take 1 tablet (80 mg total) by mouth daily. 90 tablet 1     No current facility-administered medications for this visit.     Social History:   Social History     Socioeconomic History    Marital status:      Spouse name: None    Number of children: None    Years of education: None    Highest education level: None   Tobacco Use    Smoking status: Never    Smokeless tobacco: Never   Substance and Sexual Activity    Alcohol use: Yes     Alcohol/week: 7.0 standard drinks of alcohol     Types: 7 Glasses of wine per week    Drug use: Defer    Sexual activity: Defer     Family History:   Family History   Problem Relation Age of Onset    Hypertension Biological Mother     Heart attack Biological Brother     Heart disease Biological Brother     Colon cancer Nephew      ROS    Objective   Vitals:    06/13/24 1036   BP: 122/78   Pulse: 77   Resp: 18   SpO2: 96%         Physical Exam  HENT:      Head:      Comments: Prominent thyroid      Labs   Lab Results   Component Value Date    WBC 4.0 05/23/2024    HGB 14.6 05/23/2024    HCT 44.4 05/23/2024     05/23/2024    CHOL 204 (H) 05/23/2024    TRIG 128 05/23/2024    HDL 64 05/23/2024    ALT 16 05/23/2024    AST 19 05/23/2024     (H) 05/23/2024    K 4.2 05/23/2024     05/23/2024    CREATININE 0.93 05/23/2024    BUN 14 05/23/2024    CO2 28 05/23/2024    TSH 1.65 12/14/2021    HGBA1C 5.5 06/01/2022       Imaging    STRESS  Stress echo neg 1/20    CAT  COR ANA SCORE ZERO 12/19 mild plaque thoracic aorta EKG 1/2020    Cor ana score zero June 2024      EKG  June 2023          Assessment/Plan     Family history of premature CAD  Her brother had bypass surgery at age 60 she had coronary calcium score  of 0 2024 remote CAT scan did show some plaque in the thoracic aorta.    Mixed hyperlipidemia  Baseline LDL cholesterol 140.  She did not tolerate Crestor.  With muscle aches.  She tolerates pravastatin 80 LDL cholesterol 118.  I would like it lower but she is reluctant to add medication at this time.  I was thinking of adding Zetia.  Will hold off lab work 1 year    Essential hypertension  Controlled with hydrochlorothiazide  She is followed for hypertension family history of premature coronary disease hyperlipidemia intolerant to Crestor tolerating pravastatin  Recent coronary calcium score 2024 0  On Pravachol 80 her LDL cholesterol is 118 good but not perfect we talked about adding Zetia she would like to hold off which is reasonable  No change in cardiac medications follow-up in 1 year with lab work repeat coronary calcium score 5 years                        Payam Sargent MD  6/13/2024

## 2024-06-12 ENCOUNTER — TELEPHONE (OUTPATIENT)
Dept: CARDIOLOGY | Facility: CLINIC | Age: 69
End: 2024-06-12
Payer: MEDICARE

## 2024-06-13 ENCOUNTER — OFFICE VISIT (OUTPATIENT)
Dept: CARDIOLOGY | Facility: CLINIC | Age: 69
End: 2024-06-13
Payer: MEDICARE

## 2024-06-13 VITALS
OXYGEN SATURATION: 96 % | WEIGHT: 179 LBS | DIASTOLIC BLOOD PRESSURE: 78 MMHG | HEART RATE: 77 BPM | RESPIRATION RATE: 18 BRPM | BODY MASS INDEX: 28.77 KG/M2 | SYSTOLIC BLOOD PRESSURE: 122 MMHG | HEIGHT: 66 IN

## 2024-06-13 DIAGNOSIS — Z82.49 FAMILY HISTORY OF PREMATURE CAD: ICD-10-CM

## 2024-06-13 DIAGNOSIS — I10 ESSENTIAL HYPERTENSION: ICD-10-CM

## 2024-06-13 DIAGNOSIS — E78.2 MIXED HYPERLIPIDEMIA: ICD-10-CM

## 2024-06-13 DIAGNOSIS — R94.31 ABNORMAL EKG: Primary | ICD-10-CM

## 2024-06-13 DIAGNOSIS — I10 ESSENTIAL HYPERTENSION: Primary | ICD-10-CM

## 2024-06-13 LAB
ATRIAL RATE: 75
P AXIS: 23
PR INTERVAL: 170
QRS DURATION: 88
QT INTERVAL: 376
QTC CALCULATION(BAZETT): 419
R AXIS: 31
T WAVE AXIS: 52
VENTRICULAR RATE: 75

## 2024-06-13 PROCEDURE — G8754 DIAS BP LESS 90: HCPCS | Performed by: INTERNAL MEDICINE

## 2024-06-13 PROCEDURE — 93000 ELECTROCARDIOGRAM COMPLETE: CPT | Performed by: INTERNAL MEDICINE

## 2024-06-13 PROCEDURE — 99214 OFFICE O/P EST MOD 30 MIN: CPT | Performed by: INTERNAL MEDICINE

## 2024-06-13 PROCEDURE — G8752 SYS BP LESS 140: HCPCS | Performed by: INTERNAL MEDICINE

## 2024-06-13 RX ORDER — PRAVASTATIN SODIUM 80 MG/1
80 TABLET ORAL DAILY
Qty: 90 TABLET | Refills: 1 | Status: SHIPPED | OUTPATIENT
Start: 2024-06-13 | End: 2024-11-25 | Stop reason: SDUPTHER

## 2024-06-13 NOTE — ASSESSMENT & PLAN NOTE
Her brother had bypass surgery at age 60 she had coronary calcium score of 0 2024 remote CAT scan did show some plaque in the thoracic aorta.

## 2024-06-13 NOTE — ASSESSMENT & PLAN NOTE
Baseline LDL cholesterol 140.  She did not tolerate Crestor.  With muscle aches.  She tolerates pravastatin 80 LDL cholesterol 118.  I would like it lower but she is reluctant to add medication at this time.  I was thinking of adding Zetia.  Will hold off lab work 1 year

## 2024-11-25 DIAGNOSIS — E78.2 MIXED HYPERLIPIDEMIA: Primary | ICD-10-CM

## 2024-11-25 RX ORDER — PRAVASTATIN SODIUM 80 MG/1
80 TABLET ORAL DAILY
Qty: 90 TABLET | Refills: 3 | Status: SHIPPED | OUTPATIENT
Start: 2024-11-25 | End: 2025-11-20

## 2025-03-13 ENCOUNTER — TELEPHONE (OUTPATIENT)
Dept: SCHEDULING | Facility: CLINIC | Age: 70
End: 2025-03-13
Payer: MEDICARE

## 2025-03-13 NOTE — TELEPHONE ENCOUNTER
Patient is ready for a call back to R/s her Apt with Hayley in June. Please Advise P:[phone]

## 2025-03-13 NOTE — TELEPHONE ENCOUNTER
I called patient but she was in the car.She said she will call back and make an appt with HAYLEY.     Please schedule her with Hayley. Schedule is open.     Thank you

## 2025-03-13 NOTE — TELEPHONE ENCOUNTER
Cardiac Clearance     Name of caller: Giuliana    Relationship to patient: self    Name of patient: Geraldine Wolff    Insurance Name: med    Name of physician: CARLOS Pham    Date of Procedure/Surgery: 4/16    Type of Procedure/Surgery: cataract    Name of surgeon: Dr.Deepak Triplett     Office contact number: [phone]    Office fax number: [phone]        Additional notes: lov 6/13/24

## 2025-03-26 LAB
ALBUMIN SERPL-MCNC: 4.3 G/DL (ref 3.9–4.9)
ALP SERPL-CCNC: 64 IU/L (ref 44–121)
ALT SERPL-CCNC: 13 IU/L (ref 0–32)
AST SERPL-CCNC: 19 IU/L (ref 0–40)
BASOPHILS # BLD AUTO: 0 X10E3/UL (ref 0–0.2)
BASOPHILS NFR BLD AUTO: 1 %
BILIRUB SERPL-MCNC: 0.5 MG/DL (ref 0–1.2)
BUN SERPL-MCNC: 19 MG/DL (ref 8–27)
BUN/CREAT SERPL: 22 (ref 12–28)
CALCIUM SERPL-MCNC: 9.7 MG/DL (ref 8.7–10.3)
CHLORIDE SERPL-SCNC: 102 MMOL/L (ref 96–106)
CO2 SERPL-SCNC: 26 MMOL/L (ref 20–29)
CREAT SERPL-MCNC: 0.87 MG/DL (ref 0.57–1)
EGFRCR SERPLBLD CKD-EPI 2021: 73 ML/MIN/1.73
EOSINOPHIL # BLD AUTO: 0.2 X10E3/UL (ref 0–0.4)
EOSINOPHIL NFR BLD AUTO: 4 %
ERYTHROCYTE [DISTWIDTH] IN BLOOD BY AUTOMATED COUNT: 12.3 % (ref 11.7–15.4)
GLOBULIN SER CALC-MCNC: 2.2 G/DL (ref 1.5–4.5)
GLUCOSE SERPL-MCNC: 99 MG/DL (ref 70–99)
HCT VFR BLD AUTO: 43 % (ref 34–46.6)
HGB BLD-MCNC: 14.7 G/DL (ref 11.1–15.9)
IMM GRANULOCYTES # BLD AUTO: 0 X10E3/UL (ref 0–0.1)
IMM GRANULOCYTES NFR BLD AUTO: 0 %
LYMPHOCYTES # BLD AUTO: 1.9 X10E3/UL (ref 0.7–3.1)
LYMPHOCYTES NFR BLD AUTO: 35 %
MCH RBC QN AUTO: 33.8 PG (ref 26.6–33)
MCHC RBC AUTO-ENTMCNC: 34.2 G/DL (ref 31.5–35.7)
MCV RBC AUTO: 99 FL (ref 79–97)
MONOCYTES # BLD AUTO: 0.5 X10E3/UL (ref 0.1–0.9)
MONOCYTES NFR BLD AUTO: 9 %
NEUTROPHILS # BLD AUTO: 2.7 X10E3/UL (ref 1.4–7)
NEUTROPHILS NFR BLD AUTO: 51 %
PLATELET # BLD AUTO: 219 X10E3/UL (ref 150–450)
POTASSIUM SERPL-SCNC: 4.5 MMOL/L (ref 3.5–5.2)
PROT SERPL-MCNC: 6.5 G/DL (ref 6–8.5)
RBC # BLD AUTO: 4.35 X10E6/UL (ref 3.77–5.28)
SODIUM SERPL-SCNC: 143 MMOL/L (ref 134–144)
WBC # BLD AUTO: 5.3 X10E3/UL (ref 3.4–10.8)

## 2025-03-27 LAB
CHOLEST SERPL-MCNC: 198 MG/DL (ref 100–199)
HDLC SERPL-MCNC: 57 MG/DL
LDLC SERPL CALC-MCNC: 113 MG/DL (ref 0–99)
TRIGL SERPL-MCNC: 161 MG/DL (ref 0–149)
VLDLC SERPL CALC-MCNC: 28 MG/DL (ref 5–40)

## 2025-04-09 ENCOUNTER — OFFICE VISIT (OUTPATIENT)
Dept: CARDIOLOGY | Facility: CLINIC | Age: 70
End: 2025-04-09
Payer: MEDICARE

## 2025-04-09 VITALS
DIASTOLIC BLOOD PRESSURE: 80 MMHG | HEART RATE: 78 BPM | WEIGHT: 183 LBS | OXYGEN SATURATION: 99 % | HEIGHT: 66 IN | SYSTOLIC BLOOD PRESSURE: 126 MMHG | BODY MASS INDEX: 29.41 KG/M2

## 2025-04-09 DIAGNOSIS — R94.31 ABNORMAL EKG: Primary | ICD-10-CM

## 2025-04-09 DIAGNOSIS — E78.2 MIXED HYPERLIPIDEMIA: ICD-10-CM

## 2025-04-09 DIAGNOSIS — Z82.49 FAMILY HISTORY OF PREMATURE CAD: ICD-10-CM

## 2025-04-09 DIAGNOSIS — I73.9 PVD (PERIPHERAL VASCULAR DISEASE) (CMS/HCC): ICD-10-CM

## 2025-04-09 DIAGNOSIS — Z01.810 PREOP CARDIOVASCULAR EXAM: ICD-10-CM

## 2025-04-09 DIAGNOSIS — I10 ESSENTIAL HYPERTENSION: ICD-10-CM

## 2025-04-09 LAB
ATRIAL RATE: 73
P AXIS: 7
PR INTERVAL: 176
QRS DURATION: 88
QT INTERVAL: 398
QTC CALCULATION(BAZETT): 438
R AXIS: 28
T WAVE AXIS: 51
VENTRICULAR RATE: 73

## 2025-04-09 PROCEDURE — G8752 SYS BP LESS 140: HCPCS | Performed by: NURSE PRACTITIONER

## 2025-04-09 PROCEDURE — 99214 OFFICE O/P EST MOD 30 MIN: CPT | Performed by: NURSE PRACTITIONER

## 2025-04-09 PROCEDURE — G8754 DIAS BP LESS 90: HCPCS | Performed by: NURSE PRACTITIONER

## 2025-04-09 PROCEDURE — 93000 ELECTROCARDIOGRAM COMPLETE: CPT | Performed by: NURSE PRACTITIONER

## 2025-04-09 RX ORDER — CHOLECALCIFEROL (VITAMIN D3) 25 MCG
25 TABLET ORAL DAILY
COMMUNITY

## 2025-04-09 ASSESSMENT — ENCOUNTER SYMPTOMS
PALPITATIONS: 0
VOMITING: 0
CHILLS: 0
FEVER: 0
LIGHT-HEADEDNESS: 0
SHORTNESS OF BREATH: 0
NERVOUS/ANXIOUS: 0
NAUSEA: 0
DYSURIA: 0
BACK PAIN: 0
CHEST TIGHTNESS: 0
COUGH: 0
DIARRHEA: 0
DIZZINESS: 0
UNEXPECTED WEIGHT CHANGE: 0

## 2025-04-09 NOTE — ASSESSMENT & PLAN NOTE
She is scheduled for cataract surgery  She has no active cardiopulmonary symptoms.  She has good functional capacity, able to walk 2 blocks or climb a flight of stairs without cardiopulmonary limitation.  Using the Purvis risk stratification tool, she has a less than 1% risk of perioperative major adverse cardiac events.  Additional cardiac testing or interventions would be unlikely to decrease this risk and therefore would not .    She may proceed with the proposed procedure as planned.

## 2025-04-09 NOTE — PROGRESS NOTES
CARLOS Gonzalez    [facility]  [facility]  [address] [place] [address]        2025        Patient Name: Geraldine Wolff  Account:          [id number]  :               1955        Dear Dayna Mcclellan MD:     I had the pleasure of seeing your patient, Geraldine Wolff, on 2025. As you know, she is a 69 y.o. female with medical history as described below.     HPI  69 y.o. female with PMH significant for family history of premature cardiac disease with brother having a CABG, no known obstructive CAD with calcium score of 0 in , arteriosclerosis with plaque seen in thoracic aorta, HTN and HLD.     Returns for cardiology follow up with cardiac clearance for cataract surgery.     For exercise she walks outside daily with her dog. There are significant hills near her house and she notices shortness of breath with the hills. She does not think this is any worse than usual. Denies chest pain with exertion.          The 10-year ASCVD risk score (Yamila DK, et al., 2019) is: 9.8%    Values used to calculate the score:      Age: 69 years      Sex: Female      Is Non- : No      Diabetic: No      Tobacco smoker: No      Systolic Blood Pressure: 126 mmHg      Is BP treated: Yes      HDL Cholesterol: 57 mg/dL      Total Cholesterol: 198 mg/dL     CARDIOVASCULAR STUDIES:     Lab Results   Component Value Date    CHOL 198 2025    CHOL 204 (H) 2024    TRIG 161 (H) 2025    TRIG 128 2024    HDL 57 2025    HDL 64 2024    LDLCALC 113 (H) 2025    LDLCALC 118 (H) 2024       ECG: Independently reviewed:   2025        CORONARY CALCIUM SCAN:  Results for orders placed during the hospital encounter of 24    CT HEART CORONARY ARTERY CALCIUM SCORE WITHOUT IV CONTRAST: 2024  Narrative  CLINICAL HISTORY: Hyperlipidemia    COMMENT:    1) PROCEDURE: Cardiac gated axial CT  noncontract imaging of the heart was  performed. Images were processed with dedicated Vitrea software on a dedicated  PACS Workstation, reviewed by the Radiologist and the coronary artery calcium  score was tabulated.    CT DOSE:  One or more dose reduction techniques (e.g. automated exposure  control, adjustment of the mA and/or kV according to patient size, use of  iterative reconstruction technique) utilized for this examination.    2) CARDIAC FINDINGS:    CORONARY CALCIUM COMPOSITE AGATSTON SCORE: 0    Left Main: 0    LAD: 0    LCX: 0    RCA: 0    3) NON-CARDIAC FINDINGS    AORTA: Unremarkable.    PULMONARY VEINS: Unremarkable.    PULMONARY ARTERIES: Unremarkable.    SVC: Unremarkable.    IVC: Unremarkable.    LUNG VALENZUELA: Unremarkable.    LYMPH NODES: Unremarkable.    OSSEOUS STRUCTURES: Unremarkable.    OTHER: Unremarkable.    Impression  IMPRESSION:  1. Composite Agatston coronary artery calcium score of 0 suggesting a less than  5% risk of coronary artery disease.  2. Noncardiac portions of the examination were within normal limits.      STRESS TESTS:   Results for orders placed during the hospital encounter of 01/23/20    ECHOCARDIOGRAM STRESS TEST: Jan 2020  Narrative  Ordered by an unspecified provider.          Past Medical History:   Diagnosis Date    GERD (gastroesophageal reflux disease)         Past Surgical History   Procedure Laterality Date    Cholecystectomy      Fracture surgery          Family History   Problem Relation Name Age of Onset    Hypertension Biological Mother      Heart attack Biological Brother      Heart disease Biological Brother      Colon cancer Nephew John         Social History     Socioeconomic History    Marital status:      Spouse name: None    Number of children: None    Years of education: None    Highest education level: None   Tobacco Use    Smoking status: Never    Smokeless tobacco: Never   Substance and Sexual Activity    Alcohol use: Yes     Alcohol/week:  7.0 standard drinks of alcohol     Types: 7 Glasses of wine per week    Drug use: Defer    Sexual activity: Defer        Current Outpatient Medications   Medication Sig Dispense Refill    albuterol HFA (VENTOLIN HFA) 90 mcg/actuation inhaler Inhale 2 puffs as needed for wheezing.      calcium carbonate/vitamin D3 (CALCIUM 600 + D,3, ORAL) Take by mouth daily.      cholecalciferol, vitamin D3, 1,000 unit (25 mcg) tablet Take 25 mcg by mouth daily.      cholecalciferol, vitamin D3, 5,000 unit (125 mcg) capsule Take 5,000 Units by mouth daily.      coenzyme Q10 (COQ10) 100 mg capsule Take 100 mg by mouth daily.      fluticasone propionate (FLONASE) 50 mcg/actuation nasal spray Administer 1 spray into each nostril daily.      hydrochlorothiazide (HYDRODIURIL) 25 mg tablet Take 25 mg by mouth daily.        meloxicam (MOBIC) 15 mg tablet Take 15 mg by mouth daily.      montelukast (SINGULAIR) 10 mg tablet Take by mouth nightly.      mv-min/iron/folic/calcium/vitK (WOMEN'S MULTIVITAMIN ORAL) Take by mouth daily.      omeprazole (PriLOSEC) 20 mg capsule Take 20 mg by mouth See admin instr. Patient takes one tablet 2 times a week      pravastatin (PRAVACHOL) 80 mg tablet Take 1 tablet (80 mg total) by mouth daily. 90 tablet 3     No current facility-administered medications for this visit.        Allergies:  Hydrocodone-acetaminophen and Rosuvastatin       Review of Systems   Constitutional:  Negative for chills, fever and unexpected weight change.   HENT:  Negative for nosebleeds.    Respiratory:  Negative for cough, chest tightness and shortness of breath.    Cardiovascular:  Negative for chest pain, palpitations and leg swelling.   Gastrointestinal:  Negative for diarrhea, nausea and vomiting.   Genitourinary:  Negative for dysuria.   Musculoskeletal:  Negative for back pain.   Skin:  Negative for pallor.   Neurological:  Negative for dizziness, syncope and light-headedness.   Psychiatric/Behavioral:  The patient is not  "nervous/anxious.         Vitals:    04/09/25 1343   BP: 126/80   BP Location: Left upper arm   Patient Position: Sitting   Pulse: 78   SpO2: 99%   Weight: 83 kg (183 lb)   Height: 1.676 m (5' 6\")     Body mass index is 29.54 kg/m².     Physical Exam  Constitutional:       Appearance: Normal appearance.   HENT:      Head: Normocephalic and atraumatic.      Mouth/Throat:      Mouth: Mucous membranes are moist.   Eyes:      Conjunctiva/sclera: Conjunctivae normal.   Cardiovascular:      Rate and Rhythm: Normal rate and regular rhythm.      Pulses: Normal pulses.      Heart sounds: Normal heart sounds.   Pulmonary:      Effort: Pulmonary effort is normal.      Breath sounds: Normal breath sounds.   Abdominal:      Palpations: Abdomen is soft.   Musculoskeletal:         General: Normal range of motion.   Skin:     General: Skin is warm and dry.   Neurological:      General: No focal deficit present.      Mental Status: She is alert and oriented to person, place, and time.   Psychiatric:         Mood and Affect: Mood normal.         Behavior: Behavior normal.             Diagnosis Plan   [facility]. [facility]    Lipid panel    Comprehensive metabolic panel    Lipid panel    Comprehensive metabolic panel      2. Essential hypertension  [facility] ECG 12 lead (clinic performed)    Lipid panel    Comprehensive metabolic panel    Lipid panel    Comprehensive metabolic panel      3. Mixed hyperlipidemia  [facility] MUSE ECG 12 lead (clinic performed)    Lipid panel    Comprehensive metabolic panel    Lipid panel    Comprehensive metabolic panel      4. Family history of premature CAD  [facility] MUSE ECG 12 lead (clinic performed)    Lipid panel    Comprehensive metabolic panel    Lipid panel    Comprehensive metabolic panel      5. PVD (peripheral vascular disease) (CMS/HCC)  [facility] MUSE ECG 12 lead (clinic performed)      6. Preop cardiovascular exam            ASSESSMENT AND PLAN:   "   69 y.o. female with PMH significant for family history of premature cardiac disease with brother having a CABG, no known obstructive CAD with calcium score of 0 in 2024, arteriosclerosis with plaque seen in thoracic aorta, HTN and HLD.     Assessment & Plan  Essential hypertension  BP at goal during this office visit  Continue HCTZ  [facility]    Lipid panel; Future    Comprehensive metabolic panel; Future    Mixed hyperlipidemia   in March 2025  Continue Pravastatin  To consider starting Zetia to get LDL less than 100  [facility]    Lipid panel; Future    Comprehensive metabolic panel; Future    Family history of premature CAD  Brother had CABG in the past  No known obstructive CAD noted with calcium score of 0 in 2024  [facility]    Lipid panel; Future    Comprehensive metabolic panel; Future    PVD (peripheral vascular disease) (CMS/HCC)  Not noted on EKG today  She will call the office with symptoms of palpitations  [facility]    Preop cardiovascular exam  She is scheduled for cataract surgery  She has no active cardiopulmonary symptoms.  She has good functional capacity, able to walk 2 blocks or climb a flight of stairs without cardiopulmonary limitation.  Using the Purvis risk stratification tool, she has a less than 1% risk of perioperative major adverse cardiac events.  Additional cardiac testing or interventions would be unlikely to decrease this risk and therefore would not .    She may proceed with the proposed procedure as planned.               Follow up in 1 year with labs  Call the office if you need to be seen prior to next scheduled office visit. Call office if experiencing chest pain or shortness of breath with exertion.     I thank you for the opportunity to take part in the care of Geraldine  New.  Please do not hesitate to contact me with any questions or concerns.     Sincerely,  CARLOS Pham  Cardiovascular Disease  4/9/2025

## 2025-04-09 NOTE — TELEPHONE ENCOUNTER
Cardiac clearance and ECG faxed to Dr. Valencia at [phone]. Confirmation of fax report received.

## 2025-04-09 NOTE — ASSESSMENT & PLAN NOTE
Brother had CABG in the past  No known obstructive CAD noted with calcium score of 0 in 2024  Orders:    [facility] MUSE ECG 12 lead (clinic performed)    Lipid panel; Future    Comprehensive metabolic panel; Future

## 2025-04-09 NOTE — ASSESSMENT & PLAN NOTE
Orders:    [facility] MUSE ECG 12 lead (clinic performed)    Lipid panel; Future    Comprehensive metabolic panel; Future

## 2025-04-09 NOTE — ASSESSMENT & PLAN NOTE
BP at goal during this office visit  Continue HCTZ  Orders:    [facility] MUSE ECG 12 lead (clinic performed)    Lipid panel; Future    Comprehensive metabolic panel; Future

## 2025-04-09 NOTE — ASSESSMENT & PLAN NOTE
in March 2025  Continue Pravastatin  To consider starting Zetia to get LDL less than 100  Orders:    [facility] MUSE ECG 12 lead (clinic performed)    Lipid panel; Future    Comprehensive metabolic panel; Future

## 2025-04-09 NOTE — ASSESSMENT & PLAN NOTE
Not noted on EKG today  She will call the office with symptoms of palpitations  Orders:    [facility] MUSE ECG 12 lead (clinic performed)